# Patient Record
Sex: FEMALE | Race: ASIAN | URBAN - METROPOLITAN AREA
[De-identification: names, ages, dates, MRNs, and addresses within clinical notes are randomized per-mention and may not be internally consistent; named-entity substitution may affect disease eponyms.]

---

## 2017-02-20 ENCOUNTER — APPOINTMENT (OUTPATIENT)
Dept: ULTRASOUND IMAGING | Facility: CLINIC | Age: 22
End: 2017-02-20
Attending: EMERGENCY MEDICINE
Payer: COMMERCIAL

## 2017-02-20 ENCOUNTER — TRANSFERRED RECORDS (OUTPATIENT)
Dept: HEALTH INFORMATION MANAGEMENT | Facility: CLINIC | Age: 22
End: 2017-02-20

## 2017-02-20 ENCOUNTER — APPOINTMENT (OUTPATIENT)
Dept: CT IMAGING | Facility: CLINIC | Age: 22
End: 2017-02-20
Attending: EMERGENCY MEDICINE
Payer: COMMERCIAL

## 2017-02-20 ENCOUNTER — HOSPITAL ENCOUNTER (OUTPATIENT)
Facility: CLINIC | Age: 22
Setting detail: OBSERVATION
Discharge: HOME | End: 2017-02-21
Attending: EMERGENCY MEDICINE | Admitting: EMERGENCY MEDICINE
Payer: COMMERCIAL

## 2017-02-20 DIAGNOSIS — J02.9 ACUTE PHARYNGITIS, UNSPECIFIED ETIOLOGY: ICD-10-CM

## 2017-02-20 DIAGNOSIS — R11.2 NAUSEA AND VOMITING, INTRACTABILITY OF VOMITING NOT SPECIFIED, UNSPECIFIED VOMITING TYPE: ICD-10-CM

## 2017-02-20 DIAGNOSIS — R10.84 ABDOMINAL PAIN, GENERALIZED: ICD-10-CM

## 2017-02-20 DIAGNOSIS — G89.18 ACUTE POST-OPERATIVE PAIN: ICD-10-CM

## 2017-02-20 DIAGNOSIS — K59.00 CONSTIPATION, UNSPECIFIED CONSTIPATION TYPE: Primary | ICD-10-CM

## 2017-02-20 PROBLEM — R10.9 ABDOMINAL PAIN: Status: ACTIVE | Noted: 2017-02-20

## 2017-02-20 LAB
ALBUMIN SERPL-MCNC: 4.3 G/DL (ref 3.4–5)
ALP SERPL-CCNC: 77 U/L (ref 40–150)
ALT SERPL W P-5'-P-CCNC: 20 U/L (ref 0–50)
ANION GAP SERPL CALCULATED.3IONS-SCNC: 9 MMOL/L (ref 3–14)
AST SERPL W P-5'-P-CCNC: 14 U/L (ref 0–35)
BASOPHILS # BLD AUTO: 0 10E9/L (ref 0–0.2)
BASOPHILS NFR BLD AUTO: 0.2 %
BILIRUB SERPL-MCNC: 0.6 MG/DL (ref 0.2–1.3)
BUN SERPL-MCNC: 8 MG/DL (ref 7–30)
CALCIUM SERPL-MCNC: 9.1 MG/DL (ref 8.5–10.1)
CHLORIDE SERPL-SCNC: 105 MMOL/L (ref 96–110)
CO2 SERPL-SCNC: 26 MMOL/L (ref 20–32)
CREAT SERPL-MCNC: 0.66 MG/DL (ref 0.5–1)
DEPRECATED S PYO AG THROAT QL EIA: NORMAL
DIFFERENTIAL METHOD BLD: ABNORMAL
EOSINOPHIL # BLD AUTO: 0 10E9/L (ref 0–0.7)
EOSINOPHIL NFR BLD AUTO: 0 %
ERYTHROCYTE [DISTWIDTH] IN BLOOD BY AUTOMATED COUNT: 13.4 % (ref 10–15)
GFR SERPL CREATININE-BSD FRML MDRD: ABNORMAL ML/MIN/1.7M2
GLUCOSE SERPL-MCNC: 130 MG/DL (ref 70–99)
HCG SERPL QL: NEGATIVE
HCT VFR BLD AUTO: 41.6 % (ref 35–47)
HGB BLD-MCNC: 13.9 G/DL (ref 11.7–15.7)
IMM GRANULOCYTES # BLD: 0 10E9/L (ref 0–0.4)
IMM GRANULOCYTES NFR BLD: 0.3 %
LIPASE SERPL-CCNC: 118 U/L (ref 73–393)
LYMPHOCYTES # BLD AUTO: 0.5 10E9/L (ref 0.8–5.3)
LYMPHOCYTES NFR BLD AUTO: 3.3 %
MCH RBC QN AUTO: 29.8 PG (ref 26.5–33)
MCHC RBC AUTO-ENTMCNC: 33.4 G/DL (ref 31.5–36.5)
MCV RBC AUTO: 89 FL (ref 78–100)
MICRO REPORT STATUS: NORMAL
MONOCYTES # BLD AUTO: 0.4 10E9/L (ref 0–1.3)
MONOCYTES NFR BLD AUTO: 2.6 %
NEUTROPHILS # BLD AUTO: 13.5 10E9/L (ref 1.6–8.3)
NEUTROPHILS NFR BLD AUTO: 93.6 %
NRBC # BLD AUTO: 0 10*3/UL
NRBC BLD AUTO-RTO: 0 /100
PLATELET # BLD AUTO: 233 10E9/L (ref 150–450)
POTASSIUM SERPL-SCNC: 3.7 MMOL/L (ref 3.4–5.3)
PROT SERPL-MCNC: 8.4 G/DL (ref 6.8–8.8)
RBC # BLD AUTO: 4.67 10E12/L (ref 3.8–5.2)
SODIUM SERPL-SCNC: 140 MMOL/L (ref 133–144)
SPECIMEN SOURCE: NORMAL
WBC # BLD AUTO: 14.4 10E9/L (ref 4–11)

## 2017-02-20 PROCEDURE — 96374 THER/PROPH/DIAG INJ IV PUSH: CPT | Performed by: EMERGENCY MEDICINE

## 2017-02-20 PROCEDURE — 25800025 ZZH RX 258: Performed by: PHYSICIAN ASSISTANT

## 2017-02-20 PROCEDURE — 87880 STREP A ASSAY W/OPTIC: CPT | Performed by: PHYSICIAN ASSISTANT

## 2017-02-20 PROCEDURE — 83690 ASSAY OF LIPASE: CPT | Performed by: EMERGENCY MEDICINE

## 2017-02-20 PROCEDURE — 96361 HYDRATE IV INFUSION ADD-ON: CPT

## 2017-02-20 PROCEDURE — 87070 CULTURE OTHR SPECIMN AEROBIC: CPT | Performed by: PHYSICIAN ASSISTANT

## 2017-02-20 PROCEDURE — 99285 EMERGENCY DEPT VISIT HI MDM: CPT | Mod: 25 | Performed by: EMERGENCY MEDICINE

## 2017-02-20 PROCEDURE — 96375 TX/PRO/DX INJ NEW DRUG ADDON: CPT

## 2017-02-20 PROCEDURE — 25000125 ZZHC RX 250: Performed by: PHYSICIAN ASSISTANT

## 2017-02-20 PROCEDURE — 76705 ECHO EXAM OF ABDOMEN: CPT

## 2017-02-20 PROCEDURE — 84100 ASSAY OF PHOSPHORUS: CPT | Performed by: EMERGENCY MEDICINE

## 2017-02-20 PROCEDURE — 25500064 ZZH RX 255 OP 636: Performed by: RADIOLOGY

## 2017-02-20 PROCEDURE — 25000128 H RX IP 250 OP 636: Performed by: EMERGENCY MEDICINE

## 2017-02-20 PROCEDURE — 85025 COMPLETE CBC W/AUTO DIFF WBC: CPT | Performed by: EMERGENCY MEDICINE

## 2017-02-20 PROCEDURE — 99207 ZZC APP CREDIT; MD BILLING SHARED VISIT: CPT | Mod: Z6 | Performed by: EMERGENCY MEDICINE

## 2017-02-20 PROCEDURE — 96376 TX/PRO/DX INJ SAME DRUG ADON: CPT

## 2017-02-20 PROCEDURE — 84703 CHORIONIC GONADOTROPIN ASSAY: CPT | Performed by: EMERGENCY MEDICINE

## 2017-02-20 PROCEDURE — 80053 COMPREHEN METABOLIC PANEL: CPT | Performed by: EMERGENCY MEDICINE

## 2017-02-20 PROCEDURE — 25000128 H RX IP 250 OP 636: Performed by: PHYSICIAN ASSISTANT

## 2017-02-20 PROCEDURE — 96361 HYDRATE IV INFUSION ADD-ON: CPT | Performed by: EMERGENCY MEDICINE

## 2017-02-20 PROCEDURE — 83735 ASSAY OF MAGNESIUM: CPT | Performed by: EMERGENCY MEDICINE

## 2017-02-20 PROCEDURE — 96376 TX/PRO/DX INJ SAME DRUG ADON: CPT | Performed by: EMERGENCY MEDICINE

## 2017-02-20 PROCEDURE — 86140 C-REACTIVE PROTEIN: CPT | Performed by: EMERGENCY MEDICINE

## 2017-02-20 PROCEDURE — 84145 PROCALCITONIN (PCT): CPT | Performed by: EMERGENCY MEDICINE

## 2017-02-20 PROCEDURE — G0378 HOSPITAL OBSERVATION PER HR: HCPCS

## 2017-02-20 PROCEDURE — 74177 CT ABD & PELVIS W/CONTRAST: CPT

## 2017-02-20 PROCEDURE — 99220 ZZC INITIAL OBSERVATION CARE,LEVL III: CPT | Mod: Z6 | Performed by: PHYSICIAN ASSISTANT

## 2017-02-20 PROCEDURE — 25000132 ZZH RX MED GY IP 250 OP 250 PS 637: Performed by: PHYSICIAN ASSISTANT

## 2017-02-20 RX ORDER — AMOXICILLIN 250 MG
1-2 CAPSULE ORAL 2 TIMES DAILY
Status: DISCONTINUED | OUTPATIENT
Start: 2017-02-20 | End: 2017-02-22 | Stop reason: HOSPADM

## 2017-02-20 RX ORDER — IOPAMIDOL 755 MG/ML
120 INJECTION, SOLUTION INTRAVASCULAR ONCE
Status: COMPLETED | OUTPATIENT
Start: 2017-02-20 | End: 2017-02-20

## 2017-02-20 RX ORDER — HYDROMORPHONE HYDROCHLORIDE 1 MG/ML
0.5 INJECTION, SOLUTION INTRAMUSCULAR; INTRAVENOUS; SUBCUTANEOUS
Status: COMPLETED | OUTPATIENT
Start: 2017-02-20 | End: 2017-02-20

## 2017-02-20 RX ORDER — NALOXONE HYDROCHLORIDE 0.4 MG/ML
.1-.4 INJECTION, SOLUTION INTRAMUSCULAR; INTRAVENOUS; SUBCUTANEOUS
Status: DISCONTINUED | OUTPATIENT
Start: 2017-02-20 | End: 2017-02-22 | Stop reason: HOSPADM

## 2017-02-20 RX ORDER — ONDANSETRON 2 MG/ML
4 INJECTION INTRAMUSCULAR; INTRAVENOUS EVERY 6 HOURS PRN
Status: DISCONTINUED | OUTPATIENT
Start: 2017-02-20 | End: 2017-02-22 | Stop reason: HOSPADM

## 2017-02-20 RX ORDER — ONDANSETRON 4 MG/1
4 TABLET, ORALLY DISINTEGRATING ORAL EVERY 6 HOURS PRN
Status: DISCONTINUED | OUTPATIENT
Start: 2017-02-20 | End: 2017-02-22 | Stop reason: HOSPADM

## 2017-02-20 RX ORDER — PROCHLORPERAZINE 25 MG
25 SUPPOSITORY, RECTAL RECTAL EVERY 12 HOURS PRN
Status: DISCONTINUED | OUTPATIENT
Start: 2017-02-20 | End: 2017-02-22 | Stop reason: HOSPADM

## 2017-02-20 RX ORDER — ACETAMINOPHEN 500 MG
1000 TABLET ORAL EVERY 6 HOURS PRN
Status: DISCONTINUED | OUTPATIENT
Start: 2017-02-20 | End: 2017-02-21

## 2017-02-20 RX ORDER — ALUMINA, MAGNESIA, AND SIMETHICONE 2400; 2400; 240 MG/30ML; MG/30ML; MG/30ML
30 SUSPENSION ORAL ONCE
Status: COMPLETED | OUTPATIENT
Start: 2017-02-20 | End: 2017-02-20

## 2017-02-20 RX ORDER — HYDROMORPHONE HYDROCHLORIDE 1 MG/ML
0.5 INJECTION, SOLUTION INTRAMUSCULAR; INTRAVENOUS; SUBCUTANEOUS ONCE
Status: COMPLETED | OUTPATIENT
Start: 2017-02-20 | End: 2017-02-20

## 2017-02-20 RX ORDER — PROCHLORPERAZINE MALEATE 5 MG
5-10 TABLET ORAL EVERY 6 HOURS PRN
Status: DISCONTINUED | OUTPATIENT
Start: 2017-02-20 | End: 2017-02-22 | Stop reason: HOSPADM

## 2017-02-20 RX ORDER — SODIUM CHLORIDE, SODIUM LACTATE, POTASSIUM CHLORIDE, CALCIUM CHLORIDE 600; 310; 30; 20 MG/100ML; MG/100ML; MG/100ML; MG/100ML
INJECTION, SOLUTION INTRAVENOUS CONTINUOUS
Status: DISCONTINUED | OUTPATIENT
Start: 2017-02-20 | End: 2017-02-22 | Stop reason: HOSPADM

## 2017-02-20 RX ADMIN — SODIUM CHLORIDE, POTASSIUM CHLORIDE, SODIUM LACTATE AND CALCIUM CHLORIDE: 600; 310; 30; 20 INJECTION, SOLUTION INTRAVENOUS at 23:16

## 2017-02-20 RX ADMIN — HYDROMORPHONE HYDROCHLORIDE 0.5 MG: 10 INJECTION, SOLUTION INTRAMUSCULAR; INTRAVENOUS; SUBCUTANEOUS at 21:55

## 2017-02-20 RX ADMIN — ALUMINUM HYDROXIDE, MAGNESIUM HYDROXIDE, AND DIMETHICONE 30 ML: 400; 400; 40 SUSPENSION ORAL at 23:16

## 2017-02-20 RX ADMIN — IOPAMIDOL 120 ML: 755 INJECTION, SOLUTION INTRAVENOUS at 16:09

## 2017-02-20 RX ADMIN — HYDROMORPHONE HYDROCHLORIDE 0.5 MG: 10 INJECTION, SOLUTION INTRAMUSCULAR; INTRAVENOUS; SUBCUTANEOUS at 16:22

## 2017-02-20 RX ADMIN — SENNOSIDES AND DOCUSATE SODIUM 2 TABLET: 8.6; 5 TABLET ORAL at 23:17

## 2017-02-20 RX ADMIN — HYDROMORPHONE HYDROCHLORIDE 0.5 MG: 10 INJECTION, SOLUTION INTRAMUSCULAR; INTRAVENOUS; SUBCUTANEOUS at 14:39

## 2017-02-20 RX ADMIN — HYDROMORPHONE HYDROCHLORIDE 0.5 MG: 10 INJECTION, SOLUTION INTRAMUSCULAR; INTRAVENOUS; SUBCUTANEOUS at 18:34

## 2017-02-20 RX ADMIN — SODIUM CHLORIDE 1000 ML: 9 INJECTION, SOLUTION INTRAVENOUS at 14:11

## 2017-02-20 RX ADMIN — PANTOPRAZOLE SODIUM 40 MG: 40 INJECTION, POWDER, FOR SOLUTION INTRAVENOUS at 23:17

## 2017-02-20 RX ADMIN — ONDANSETRON 4 MG: 2 INJECTION INTRAMUSCULAR; INTRAVENOUS at 21:55

## 2017-02-20 ASSESSMENT — ENCOUNTER SYMPTOMS
ABDOMINAL PAIN: 1
DIFFICULTY URINATING: 0
LIGHT-HEADEDNESS: 0
ADENOPATHY: 0
NECK STIFFNESS: 0
POLYDIPSIA: 0
VOMITING: 1
NECK PAIN: 0
DIARRHEA: 0
BACK PAIN: 0
AGITATION: 0
DYSURIA: 0
BRUISES/BLEEDS EASILY: 0
COLOR CHANGE: 0
SHORTNESS OF BREATH: 0
CHILLS: 0
CONSTIPATION: 0
ABDOMINAL DISTENTION: 0
HEMATURIA: 0
ANAL BLEEDING: 0
NAUSEA: 1
FEVER: 0

## 2017-02-20 ASSESSMENT — PAIN DESCRIPTION - DESCRIPTORS: DESCRIPTORS: PRESSURE;OTHER (COMMENT)

## 2017-02-20 NOTE — CONSULTS
GENERAL SURGERY CONSULT  February 20, 2017      HISTORY PRESENTING ILLNESS: Karthikeyan Abreu is a 21 year old female with no significant PMH who presents with one day history of epigastric pain with nausea and one episode of vomiting. Patient had epigastric pain last night. She took tums and went to sleep, In the morning the pain was still persistent. She had one episode of vomiting in the morning. Patient went to clinic where her labs showed leukocytosis. She was sent to the ED for further workup. Currently patient reports decreased pain. No nausea or further vomiting. No fevers or chills. Patient was constipated last night. No change in urinary habits. Of note, patient reports that she had the flu last week but she recovered quickly from it.     REVIEW OF SYSTEMS: As noted above. No headache, dizziness. No chest pain or shortness of breath. No urinary difficulties. No muscle or body aches.     PAST MEDICAL HISTORY:   Eczema     SURGICAL HISTORY:   None    SOCIAL HISTORY: Tobacco - Never. Etoh - None. Drugs - None. 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    FAMILY HISTORY: No bleeding/clotting disorders nor problems with anesthesia.     ALLERGIES:    No Known Allergies    MEDICATIONS:    No current facility-administered medications on file prior to encounter.   No current outpatient prescriptions on file prior to encounter.    PHYSICAL EXAMINATION:  Temp:  [97.9  F (36.6  C)] 97.9  F (36.6  C)  Pulse:  [92] 92  Resp:  [18] 18  BP: (121)/(79) 121/79  SpO2:  [97 %] 97 %  General: Alert, well-appearing in no acute distress.  HEENT: Normocephalic, atraumatic. Patent nares.   Respiratory: Non-labored breathing.   Cardiovascular: Regular rate and rhythm.   Gastrointestinal: Abdomen soft, non-distended. Minimally ttp over the periumbilical area and lower quadrants L>R tenderness. No rebound,   Extremities: Moving all four extremities.   Skin: As noted above. No rashes or lesions appreciated.    LABS:  Reviewed.       Recent Labs  Lab 02/20/17  1413   WBC 14.4*   HGB 13.9        Basic Metabolic Panel    Recent Labs  Lab 02/20/17  1413      POTASSIUM 3.7   CHLORIDE 105   CO2 26   BUN 8   CR 0.66   *     Liver Function Tests    Recent Labs  Lab 02/20/17  1413   AST 14   ALT 20   ALKPHOS 77   BILITOTAL 0.6   ALBUMIN 4.3     Pancreatic Enzymes    Recent Labs  Lab 02/20/17  1413   LIPASE 118     Coagulation Profile  No lab results found in last 7 days.      IMAGING:  Results for orders placed or performed during the hospital encounter of 02/20/17   US Abdomen Limited    Narrative    EXAMINATION: US ABDOMEN LIMITED, 2/20/2017 3:14 PM     COMPARISON: None.    HISTORY: Right upper quadrant pain. Normal LFTs.    FINDINGS:   Fluid: No evidence of ascites or pleural effusions.    Liver: The liver demonstrates normal echotexture measuring 17.6 cm in  clinical dimension. Somewhat prominent portal triads, likely related  to technique. There is no focal mass.     Gallbladder: There is no wall thickening, pericholecystic fluid,  positive sonographic Harrington's sign or evidence for cholelithiasis.    Bile Ducts: Both the intra- and extrahepatic biliary system are of  normal caliber.  The common bile duct measures 3 mm in diameter.    Pancreas: Visualized portions of the head and body of the pancreas are  unremarkable.     Kidney: The right kidney measures 11.0 cm long. There is no  hydronephrosis or hydroureter, no shadowing renal calculi, cystic  lesion or mass.       Impression    IMPRESSION: No US findings to explain patient's right upper quadrant  pain.     I have personally reviewed the examination and initial interpretation  and I agree with the findings.    ROBERT BURGOS MD   CT Abdomen Pelvis w Contrast    Narrative    EXAMINATION: CT ABDOMEN PELVIS W CONTRAST, 2/20/2017 4:18 PM    TECHNIQUE:  Helical CT images from the lung bases through the  symphysis pubis were obtained with IV contrast. Contrast  dose: 120 mL  of Isovue-370.    COMPARISON: Abdominal ultrasound performed the same day.    HISTORY: Diffuse abdominal pain.    FINDINGS:    Abdomen and pelvis: Mild hypoattenuation along the ligamentum teres  likely representing focal fat deposition. The liver is otherwise  unremarkable. The gallbladder, adrenal glands, kidneys, pancreas and  spleen are within normal limits. Punctate splenule posterior to the  spleen. The stomach, small bowel and colon are within normal limits.  The appendix is significantly dilated measuring 2.2 cm in diameter.  However, there is no wall thickening. There is air within the distal  appendix. There is no periappendiceal inflammatory stranding. No  extraluminal air. No suspicious adenopathy.    The bladder is mildly distended and otherwise unremarkable. Trace  retrouterine free fluid. The adnexa are within normal limits for age  with a 1.4 cm right ovarian cyst, likely a dominant follicle.    Lung bases: Lung bases are clear.    Bones and soft tissues: The soft tissues are within normal limits. No  suspicious osseous lesions.      Impression    IMPRESSION:   1. The appendix is significantly distended with stool. However, it  otherwise appears normal without inflammatory changes. Findings are  probably normal.     2. Trace retrouterine free fluid, likely physiologic.     Results of dilated appendix were discussed with the ordering physician  Dr. Osmany Bowers at 4:40 PM on 2/20/2017 by Dr. Jones.    I have personally reviewed the examination and initial interpretation  and I agree with the findings.    ROBERT BURGOS MD         CO-MORBIDITIES:   Abdominal pain, generalized    ASSESSMENT: Karthikeyan Abreu is a 21 year old female with one day history of abdominal pain, n/v. Work up significant for leukocytosis and dilated appendix. UA pending.     PLAN:    - ED observation overnight  - Repeat WBC in the morning  - Serial abdominal exams overnight.   - Follow up UA    Patient  seen, findings and plan discussed with chief resident and staff, Dr. Menendez.      Pricilla Michel MD  General Surgery, PGY-1  [phone]

## 2017-02-20 NOTE — IP AVS SNAPSHOT
Unit [address] Observation [address] [address]    Phone:  [phone]    Fax:  [phone]                                       After Visit Summary   2/20/2017    Karthikeyan Abreu    MRN: [id number]           After Visit Summary Signature Page     I have received my discharge instructions, and my questions have been answered. I have discussed any challenges I see with this plan with the nurse or doctor.    ..........................................................................................................................................  Patient/Patient Representative Signature      ..........................................................................................................................................  Patient Representative Print Name and Relationship to Patient    ..................................................               ................................................  Date                                            Time    ..........................................................................................................................................  Reviewed by Signature/Title    ...................................................              ..............................................  Date                                                            Time

## 2017-02-20 NOTE — ED NOTES
PT c/o abdominal pain since last evening + N/V. PT reports 5 BM's since last evening. BM's are hard, formed.

## 2017-02-20 NOTE — ED PROVIDER NOTES
History     Chief Complaint   Patient presents with     Abdominal Pain     HPI  Karthikeyan Abreu is a 21 year old otherwise healthy female who presents to the ED today with abdominal pain. Patient reports onset of upper abdominal pain since last night that worsened this morning. Patient was seen today by her primary physician at [place]. X rays were taken and labs were drawn. Her doctor referred her to ED with concern for possible ruptured appendix. Patient denies any urinary symptoms. She reports passing small amounts of formed stools. She notes nausea with movement. She states she vomited this morning and last night after eating dinner. She denies history of abdominal surgeries. She is not on any chronic medications. She denies fever or cough.       I have reviewed the Medications, Allergies, Past Medical and Surgical History, and Social History in the [employer] system.    PAST MEDICAL HISTORY: History reviewed. No pertinent past medical history.    PAST SURGICAL HISTORY: History reviewed. No pertinent past surgical history.    FAMILY HISTORY: No family history on file.    SOCIAL HISTORY:   Social History   Substance Use Topics     Smoking status: Not on file     Smokeless tobacco: Not on file     Alcohol use Not on file     No current facility-administered medications for this encounter.      No current outpatient prescriptions on file.      No Known Allergies      Review of Systems   Constitutional: Negative for chills and fever.   HENT: Negative for congestion.    Eyes: Negative for visual disturbance.   Respiratory: Negative for shortness of breath.    Cardiovascular: Negative for chest pain.   Gastrointestinal: Positive for abdominal pain, nausea and vomiting. Negative for abdominal distention, anal bleeding, constipation and diarrhea.   Endocrine: Negative for polydipsia and polyuria.   Genitourinary: Negative for difficulty urinating, dysuria, hematuria, pelvic pain, vaginal bleeding and vaginal discharge.  "  Musculoskeletal: Negative for back pain, neck pain and neck stiffness.   Skin: Negative for color change.   Allergic/Immunologic: Negative for immunocompromised state.   Neurological: Negative for light-headedness.   Hematological: Negative for adenopathy. Does not bruise/bleed easily.   Psychiatric/Behavioral: Negative for agitation and behavioral problems.       Physical Exam   BP: 121/79  Pulse: 92  Temp: 97.9  F (36.6  C)  Resp: 18  Height: 170.2 cm (5' 7\")  Weight: 88.9 kg (196 lb)  SpO2: 97 %  Physical Exam   Constitutional: She is oriented to person, place, and time. She appears well-developed and well-nourished. No distress.   HENT:   Head: Normocephalic and atraumatic.   Mouth/Throat: Oropharynx is clear and moist. No oropharyngeal exudate.   Eyes: Conjunctivae and EOM are normal. No scleral icterus.   Neck: Normal range of motion. Neck supple.   Cardiovascular: Normal rate, normal heart sounds and intact distal pulses.    Pulmonary/Chest: Effort normal and breath sounds normal. No respiratory distress. She has no wheezes. She has no rales.   Abdominal: Soft. Normal appearance and bowel sounds are normal. She exhibits no distension. There is no hepatosplenomegaly. Tenderness:  diffuse. There is no rebound, no guarding, no CVA tenderness, no tenderness at McBurney's point and negative Harrington's sign.   Musculoskeletal: Normal range of motion. She exhibits no edema or tenderness.   Neurological: She is alert and oriented to person, place, and time. No cranial nerve deficit. She exhibits normal muscle tone. Coordination normal.   Skin: Skin is warm. No rash noted. She is not diaphoretic.   Psychiatric: She has a normal mood and affect. Her behavior is normal. Judgment and thought content normal.   Nursing note and vitals reviewed.      ED Course     ED Course     Procedures       2:14 PM  The patient was seen and examined by Dr. Bowers in Room 20.          Critical Care time:  none               Labs Ordered " and Resulted from Time of ED Arrival Up to the Time of Departure from the ED   CBC WITH PLATELETS DIFFERENTIAL - Abnormal; Notable for the following:        Result Value    WBC 14.4 (*)     Absolute Neutrophil 13.5 (*)     Absolute Lymphocytes 0.5 (*)     All other components within normal limits   COMPREHENSIVE METABOLIC PANEL - Abnormal; Notable for the following:     Glucose 130 (*)     All other components within normal limits   LIPASE   HCG QUALITATIVE   ROUTINE UA WITH MICROSCOPIC   PERIPHERAL IV CATHETER       Assessments & Plan (with Medical Decision Making)   21 year old woman presenting with upper abdominal pain, nausea and vomiting since last night.  Differential diagnosis: acute pancreatitis, acute cholecystitis, hepatitis, PUD, GERD, esophageal spasm, unlikely acute appendicitis, pregnancy.    After thorough history and physical exam patient appears to be in no acute distress. I will give her a dose of IV Dilaudid for pain management. I will obtain laboratory studies and a right upper quadrant ultrasound for further evaluation. She agrees with the plan.     Patient s laboratory studies returned with a leukocytosis 14,400. There is no anemia, hemoglobin is normal at 13.9. Electrolytes show no evidence of dehydration, creatinine is normal at 0.66. LFTs and lipase are normal. Patient is not pregnant. I reviewed her right upper quadrant ultrasound and I read the radiology report; there is no evidence of acute cholecystitis or cholelithiasis. I did order CT abdomen and pelvis. I reviewed the study and read the radiology report; the appendix appears dilated with the diameter measuring 2.2 cm. There is no other etiology to explain patient s symptoms. I consulted to General Surgery due to concern that this could be acute appendicitis. They will come down and see the patient.    5:40 PM  Patient will be signed out to my partner pending surgical consultation and disposition.    I have reviewed the nursing  notes.    I have reviewed the findings, diagnosis, plan and need for follow up with the patient.    New Prescriptions    No medications on file       Final diagnoses:   Abdominal pain, generalized     IAsad , am serving as a trained medical scribe to document services personally performed by Black Bowers MD, based on the provider's statements to me.   IBlack MD, was physically present and have reviewed and verified the accuracy of this note documented by Asad Mims.    2/20/2017   [facility], [place], EMERGENCY DEPARTMENT     Jackson Bowers MD  02/20/17 [id number]

## 2017-02-21 ENCOUNTER — ANESTHESIA EVENT (OUTPATIENT)
Dept: SURGERY | Facility: CLINIC | Age: 22
End: 2017-02-21
Payer: COMMERCIAL

## 2017-02-21 ENCOUNTER — ANESTHESIA (OUTPATIENT)
Dept: SURGERY | Facility: CLINIC | Age: 22
End: 2017-02-21
Payer: COMMERCIAL

## 2017-02-21 VITALS
TEMPERATURE: 98.1 F | SYSTOLIC BLOOD PRESSURE: 126 MMHG | HEART RATE: 90 BPM | DIASTOLIC BLOOD PRESSURE: 78 MMHG | WEIGHT: 196 LBS | OXYGEN SATURATION: 97 % | BODY MASS INDEX: 30.76 KG/M2 | RESPIRATION RATE: 18 BRPM | HEIGHT: 67 IN

## 2017-02-21 LAB
ALBUMIN SERPL-MCNC: 3.6 G/DL (ref 3.4–5)
ALBUMIN UR-MCNC: NEGATIVE MG/DL
ALP SERPL-CCNC: 61 U/L (ref 40–150)
ALT SERPL W P-5'-P-CCNC: 15 U/L (ref 0–50)
ANION GAP SERPL CALCULATED.3IONS-SCNC: 9 MMOL/L (ref 3–14)
APPEARANCE UR: CLEAR
AST SERPL W P-5'-P-CCNC: 7 U/L (ref 0–35)
BILIRUB SERPL-MCNC: 1 MG/DL (ref 0.2–1.3)
BILIRUB UR QL STRIP: NEGATIVE
BUN SERPL-MCNC: 6 MG/DL (ref 7–30)
CALCIUM SERPL-MCNC: 8.6 MG/DL (ref 8.5–10.1)
CHLORIDE SERPL-SCNC: 103 MMOL/L (ref 96–110)
CO2 SERPL-SCNC: 24 MMOL/L (ref 20–32)
COLOR UR AUTO: YELLOW
CREAT SERPL-MCNC: 0.56 MG/DL (ref 0.5–1)
CRP SERPL-MCNC: 68 MG/L (ref 0–8)
CRP SERPL-MCNC: 9.1 MG/L (ref 0–8)
ERYTHROCYTE [DISTWIDTH] IN BLOOD BY AUTOMATED COUNT: 13.4 % (ref 10–15)
GFR SERPL CREATININE-BSD FRML MDRD: ABNORMAL ML/MIN/1.7M2
GLUCOSE SERPL-MCNC: 122 MG/DL (ref 70–99)
GLUCOSE UR STRIP-MCNC: NEGATIVE MG/DL
HCT VFR BLD AUTO: 37.7 % (ref 35–47)
HGB BLD-MCNC: 12.7 G/DL (ref 11.7–15.7)
HGB UR QL STRIP: NEGATIVE
KETONES UR STRIP-MCNC: NEGATIVE MG/DL
LACTATE BLD-SCNC: 1.1 MMOL/L (ref 0.7–2.1)
LEUKOCYTE ESTERASE UR QL STRIP: NEGATIVE
MAGNESIUM SERPL-MCNC: 2 MG/DL (ref 1.6–2.3)
MCH RBC QN AUTO: 29.9 PG (ref 26.5–33)
MCHC RBC AUTO-ENTMCNC: 33.7 G/DL (ref 31.5–36.5)
MCV RBC AUTO: 89 FL (ref 78–100)
MUCOUS THREADS #/AREA URNS LPF: PRESENT /LPF
NITRATE UR QL: NEGATIVE
PH UR STRIP: 6.5 PH (ref 5–7)
PHOSPHATE SERPL-MCNC: 2.9 MG/DL (ref 2.5–4.5)
PLATELET # BLD AUTO: 178 10E9/L (ref 150–450)
POTASSIUM SERPL-SCNC: 3.8 MMOL/L (ref 3.4–5.3)
PROCALCITONIN SERPL-MCNC: NORMAL NG/ML
PROT SERPL-MCNC: 7.1 G/DL (ref 6.8–8.8)
RBC # BLD AUTO: 4.25 10E12/L (ref 3.8–5.2)
RBC #/AREA URNS AUTO: 0 /HPF (ref 0–2)
SODIUM SERPL-SCNC: 136 MMOL/L (ref 133–144)
SP GR UR STRIP: 1.04 (ref 1–1.03)
SQUAMOUS #/AREA URNS AUTO: <1 /HPF (ref 0–1)
TRANS CELLS #/AREA URNS HPF: <1 /HPF (ref 0–1)
URN SPEC COLLECT METH UR: ABNORMAL
UROBILINOGEN UR STRIP-MCNC: NORMAL MG/DL (ref 0–2)
WBC # BLD AUTO: 16.6 10E9/L (ref 4–11)
WBC #/AREA URNS AUTO: 0 /HPF (ref 0–2)

## 2017-02-21 PROCEDURE — 25000128 H RX IP 250 OP 636: Performed by: SURGERY

## 2017-02-21 PROCEDURE — 83605 ASSAY OF LACTIC ACID: CPT | Performed by: EMERGENCY MEDICINE

## 2017-02-21 PROCEDURE — 37000008 ZZH ANESTHESIA TECHNICAL FEE, 1ST 30 MIN: Performed by: SURGERY

## 2017-02-21 PROCEDURE — 96367 TX/PROPH/DG ADDL SEQ IV INF: CPT

## 2017-02-21 PROCEDURE — 96365 THER/PROPH/DIAG IV INF INIT: CPT

## 2017-02-21 PROCEDURE — 96361 HYDRATE IV INFUSION ADD-ON: CPT

## 2017-02-21 PROCEDURE — 71000014 ZZH RECOVERY PHASE 1 LEVEL 2 FIRST HR: Performed by: SURGERY

## 2017-02-21 PROCEDURE — 25000128 H RX IP 250 OP 636: Performed by: NURSE ANESTHETIST, CERTIFIED REGISTERED

## 2017-02-21 PROCEDURE — 25800025 ZZH RX 258: Performed by: NURSE ANESTHETIST, CERTIFIED REGISTERED

## 2017-02-21 PROCEDURE — 25000566 ZZH SEVOFLURANE, EA 15 MIN: Performed by: SURGERY

## 2017-02-21 PROCEDURE — 25000125 ZZHC RX 250: Performed by: NURSE ANESTHETIST, CERTIFIED REGISTERED

## 2017-02-21 PROCEDURE — 36000059 ZZH SURGERY LEVEL 3 EA 15 ADDTL MIN UMMC: Performed by: SURGERY

## 2017-02-21 PROCEDURE — 25000125 ZZHC RX 250: Performed by: SURGERY

## 2017-02-21 PROCEDURE — 27210794 ZZH OR GENERAL SUPPLY STERILE: Performed by: SURGERY

## 2017-02-21 PROCEDURE — 88304 TISSUE EXAM BY PATHOLOGIST: CPT | Performed by: SURGERY

## 2017-02-21 PROCEDURE — 36415 COLL VENOUS BLD VENIPUNCTURE: CPT | Performed by: PHYSICIAN ASSISTANT

## 2017-02-21 PROCEDURE — G0378 HOSPITAL OBSERVATION PER HR: HCPCS

## 2017-02-21 PROCEDURE — 37000009 ZZH ANESTHESIA TECHNICAL FEE, EACH ADDTL 15 MIN: Performed by: SURGERY

## 2017-02-21 PROCEDURE — 36000057 ZZH SURGERY LEVEL 3 1ST 30 MIN - UMMC: Performed by: SURGERY

## 2017-02-21 PROCEDURE — 36415 COLL VENOUS BLD VENIPUNCTURE: CPT | Performed by: EMERGENCY MEDICINE

## 2017-02-21 PROCEDURE — 40000556 ZZH STATISTIC PERIPHERAL IV START W US GUIDANCE

## 2017-02-21 PROCEDURE — 96376 TX/PRO/DX INJ SAME DRUG ADON: CPT

## 2017-02-21 PROCEDURE — 85027 COMPLETE CBC AUTOMATED: CPT | Performed by: SURGERY

## 2017-02-21 PROCEDURE — 86140 C-REACTIVE PROTEIN: CPT | Performed by: PHYSICIAN ASSISTANT

## 2017-02-21 PROCEDURE — 25000128 H RX IP 250 OP 636: Performed by: PHYSICIAN ASSISTANT

## 2017-02-21 PROCEDURE — 25800025 ZZH RX 258: Performed by: PHYSICIAN ASSISTANT

## 2017-02-21 PROCEDURE — 81001 URINALYSIS AUTO W/SCOPE: CPT | Performed by: EMERGENCY MEDICINE

## 2017-02-21 PROCEDURE — 40000170 ZZH STATISTIC PRE-PROCEDURE ASSESSMENT II: Performed by: SURGERY

## 2017-02-21 PROCEDURE — 80053 COMPREHEN METABOLIC PANEL: CPT | Performed by: PHYSICIAN ASSISTANT

## 2017-02-21 PROCEDURE — 25000132 ZZH RX MED GY IP 250 OP 250 PS 637: Performed by: PHYSICIAN ASSISTANT

## 2017-02-21 PROCEDURE — 25000132 ZZH RX MED GY IP 250 OP 250 PS 637: Performed by: SURGERY

## 2017-02-21 PROCEDURE — 25000125 ZZHC RX 250: Performed by: PHYSICIAN ASSISTANT

## 2017-02-21 RX ORDER — ONDANSETRON 2 MG/ML
4 INJECTION INTRAMUSCULAR; INTRAVENOUS EVERY 30 MIN PRN
Status: DISCONTINUED | OUTPATIENT
Start: 2017-02-21 | End: 2017-02-21 | Stop reason: HOSPADM

## 2017-02-21 RX ORDER — FENTANYL CITRATE 50 UG/ML
25-50 INJECTION, SOLUTION INTRAMUSCULAR; INTRAVENOUS
Status: DISCONTINUED | OUTPATIENT
Start: 2017-02-21 | End: 2017-02-21 | Stop reason: HOSPADM

## 2017-02-21 RX ORDER — AMOXICILLIN 250 MG
1-2 CAPSULE ORAL 2 TIMES DAILY
Qty: 100 TABLET | Refills: 1 | Status: SHIPPED
Start: 2017-02-21

## 2017-02-21 RX ORDER — NEOSTIGMINE METHYLSULFATE 1 MG/ML
VIAL (ML) INJECTION PRN
Status: DISCONTINUED | OUTPATIENT
Start: 2017-02-21 | End: 2017-02-21

## 2017-02-21 RX ORDER — ONDANSETRON 4 MG/1
4 TABLET, ORALLY DISINTEGRATING ORAL EVERY 30 MIN PRN
Status: DISCONTINUED | OUTPATIENT
Start: 2017-02-21 | End: 2017-02-21 | Stop reason: HOSPADM

## 2017-02-21 RX ORDER — AMOXICILLIN 250 MG
1-2 CAPSULE ORAL 2 TIMES DAILY
Qty: 100 TABLET | Refills: 1 | Status: SHIPPED
Start: 2017-02-21 | End: 2017-02-21

## 2017-02-21 RX ORDER — CEFAZOLIN SODIUM 2 G/100ML
2 INJECTION, SOLUTION INTRAVENOUS
Status: COMPLETED | OUTPATIENT
Start: 2017-02-21 | End: 2017-02-21

## 2017-02-21 RX ORDER — HYDROMORPHONE HYDROCHLORIDE 1 MG/ML
.3-.5 INJECTION, SOLUTION INTRAMUSCULAR; INTRAVENOUS; SUBCUTANEOUS EVERY 5 MIN PRN
Status: DISCONTINUED | OUTPATIENT
Start: 2017-02-21 | End: 2017-02-21 | Stop reason: HOSPADM

## 2017-02-21 RX ORDER — ACETAMINOPHEN 10 MG/ML
1000 INJECTION, SOLUTION INTRAVENOUS ONCE
Status: COMPLETED | OUTPATIENT
Start: 2017-02-21 | End: 2017-02-21

## 2017-02-21 RX ORDER — FENTANYL CITRATE 50 UG/ML
25-50 INJECTION, SOLUTION INTRAMUSCULAR; INTRAVENOUS
Status: CANCELLED | OUTPATIENT
Start: 2017-02-21

## 2017-02-21 RX ORDER — NALOXONE HYDROCHLORIDE 0.4 MG/ML
.1-.4 INJECTION, SOLUTION INTRAMUSCULAR; INTRAVENOUS; SUBCUTANEOUS
Status: DISCONTINUED | OUTPATIENT
Start: 2017-02-21 | End: 2017-02-21 | Stop reason: HOSPADM

## 2017-02-21 RX ORDER — OXYCODONE AND ACETAMINOPHEN 5; 325 MG/1; MG/1
1 TABLET ORAL EVERY 4 HOURS PRN
Status: DISCONTINUED | OUTPATIENT
Start: 2017-02-21 | End: 2017-02-22 | Stop reason: HOSPADM

## 2017-02-21 RX ORDER — GLYCOPYRROLATE 0.2 MG/ML
INJECTION, SOLUTION INTRAMUSCULAR; INTRAVENOUS PRN
Status: DISCONTINUED | OUTPATIENT
Start: 2017-02-21 | End: 2017-02-21

## 2017-02-21 RX ORDER — DEXAMETHASONE SODIUM PHOSPHATE 4 MG/ML
INJECTION, SOLUTION INTRA-ARTICULAR; INTRALESIONAL; INTRAMUSCULAR; INTRAVENOUS; SOFT TISSUE PRN
Status: DISCONTINUED | OUTPATIENT
Start: 2017-02-21 | End: 2017-02-21

## 2017-02-21 RX ORDER — KETOROLAC TROMETHAMINE 30 MG/ML
30 INJECTION, SOLUTION INTRAMUSCULAR; INTRAVENOUS
Status: DISCONTINUED | OUTPATIENT
Start: 2017-02-21 | End: 2017-02-21 | Stop reason: HOSPADM

## 2017-02-21 RX ORDER — PROPOFOL 10 MG/ML
INJECTION, EMULSION INTRAVENOUS PRN
Status: DISCONTINUED | OUTPATIENT
Start: 2017-02-21 | End: 2017-02-21

## 2017-02-21 RX ORDER — LIDOCAINE HYDROCHLORIDE 20 MG/ML
INJECTION, SOLUTION INFILTRATION; PERINEURAL PRN
Status: DISCONTINUED | OUTPATIENT
Start: 2017-02-21 | End: 2017-02-21

## 2017-02-21 RX ORDER — ERTAPENEM 1 G/1
1 INJECTION, POWDER, LYOPHILIZED, FOR SOLUTION INTRAMUSCULAR; INTRAVENOUS ONCE
Status: COMPLETED | OUTPATIENT
Start: 2017-02-21 | End: 2017-02-21

## 2017-02-21 RX ORDER — ONDANSETRON 2 MG/ML
INJECTION INTRAMUSCULAR; INTRAVENOUS PRN
Status: DISCONTINUED | OUTPATIENT
Start: 2017-02-21 | End: 2017-02-21

## 2017-02-21 RX ORDER — OXYCODONE AND ACETAMINOPHEN 5; 325 MG/1; MG/1
1-2 TABLET ORAL EVERY 4 HOURS PRN
Qty: 15 TABLET | Refills: 0 | Status: SHIPPED | OUTPATIENT
Start: 2017-02-21 | End: 2017-02-21

## 2017-02-21 RX ORDER — CEFAZOLIN SODIUM 1 G/3ML
1 INJECTION, POWDER, FOR SOLUTION INTRAMUSCULAR; INTRAVENOUS SEE ADMIN INSTRUCTIONS
Status: DISCONTINUED | OUTPATIENT
Start: 2017-02-21 | End: 2017-02-22 | Stop reason: HOSPADM

## 2017-02-21 RX ORDER — HYDROMORPHONE HYDROCHLORIDE 1 MG/ML
0.5 INJECTION, SOLUTION INTRAMUSCULAR; INTRAVENOUS; SUBCUTANEOUS ONCE
Status: COMPLETED | OUTPATIENT
Start: 2017-02-21 | End: 2017-02-21

## 2017-02-21 RX ORDER — HYDROMORPHONE HYDROCHLORIDE 1 MG/ML
.3-.5 INJECTION, SOLUTION INTRAMUSCULAR; INTRAVENOUS; SUBCUTANEOUS EVERY 10 MIN PRN
Status: DISCONTINUED | OUTPATIENT
Start: 2017-02-21 | End: 2017-02-21 | Stop reason: HOSPADM

## 2017-02-21 RX ORDER — SODIUM CHLORIDE, SODIUM LACTATE, POTASSIUM CHLORIDE, CALCIUM CHLORIDE 600; 310; 30; 20 MG/100ML; MG/100ML; MG/100ML; MG/100ML
INJECTION, SOLUTION INTRAVENOUS CONTINUOUS
Status: DISCONTINUED | OUTPATIENT
Start: 2017-02-21 | End: 2017-02-21 | Stop reason: HOSPADM

## 2017-02-21 RX ORDER — FENTANYL CITRATE 50 UG/ML
INJECTION, SOLUTION INTRAMUSCULAR; INTRAVENOUS PRN
Status: DISCONTINUED | OUTPATIENT
Start: 2017-02-21 | End: 2017-02-21

## 2017-02-21 RX ORDER — SODIUM CHLORIDE, SODIUM LACTATE, POTASSIUM CHLORIDE, CALCIUM CHLORIDE 600; 310; 30; 20 MG/100ML; MG/100ML; MG/100ML; MG/100ML
INJECTION, SOLUTION INTRAVENOUS CONTINUOUS PRN
Status: DISCONTINUED | OUTPATIENT
Start: 2017-02-21 | End: 2017-02-21

## 2017-02-21 RX ORDER — MEPERIDINE HYDROCHLORIDE 25 MG/ML
12.5 INJECTION INTRAMUSCULAR; INTRAVENOUS; SUBCUTANEOUS
Status: DISCONTINUED | OUTPATIENT
Start: 2017-02-21 | End: 2017-02-21 | Stop reason: HOSPADM

## 2017-02-21 RX ORDER — BUPIVACAINE HYDROCHLORIDE 2.5 MG/ML
INJECTION, SOLUTION EPIDURAL; INFILTRATION; INTRACAUDAL PRN
Status: DISCONTINUED | OUTPATIENT
Start: 2017-02-21 | End: 2017-02-21 | Stop reason: HOSPADM

## 2017-02-21 RX ORDER — HYDROMORPHONE HCL/0.9% NACL/PF 0.2MG/0.2
.2-.4 SYRINGE (ML) INTRAVENOUS
Status: DISCONTINUED | OUTPATIENT
Start: 2017-02-21 | End: 2017-02-22 | Stop reason: HOSPADM

## 2017-02-21 RX ORDER — OXYCODONE AND ACETAMINOPHEN 5; 325 MG/1; MG/1
1-2 TABLET ORAL EVERY 4 HOURS PRN
Qty: 15 TABLET | Refills: 0 | Status: SHIPPED | OUTPATIENT
Start: 2017-02-21

## 2017-02-21 RX ORDER — OXYCODONE HYDROCHLORIDE 5 MG/1
5-10 TABLET ORAL EVERY 4 HOURS PRN
Status: DISCONTINUED | OUTPATIENT
Start: 2017-02-21 | End: 2017-02-21

## 2017-02-21 RX ADMIN — GLYCOPYRROLATE 0.6 MG: 0.2 INJECTION, SOLUTION INTRAMUSCULAR; INTRAVENOUS at 14:49

## 2017-02-21 RX ADMIN — ROCURONIUM BROMIDE 10 MG: 10 INJECTION INTRAVENOUS at 14:19

## 2017-02-21 RX ADMIN — SODIUM CHLORIDE, POTASSIUM CHLORIDE, SODIUM LACTATE AND CALCIUM CHLORIDE: 600; 310; 30; 20 INJECTION, SOLUTION INTRAVENOUS at 16:37

## 2017-02-21 RX ADMIN — HYDROMORPHONE HYDROCHLORIDE 0.4 MG: 10 INJECTION, SOLUTION INTRAMUSCULAR; INTRAVENOUS; SUBCUTANEOUS at 16:10

## 2017-02-21 RX ADMIN — OXYCODONE HYDROCHLORIDE AND ACETAMINOPHEN 1 TABLET: 5; 325 TABLET ORAL at 21:45

## 2017-02-21 RX ADMIN — PHENYLEPHRINE HYDROCHLORIDE 200 MCG: 10 INJECTION, SOLUTION INTRAMUSCULAR; INTRAVENOUS; SUBCUTANEOUS at 13:54

## 2017-02-21 RX ADMIN — PANTOPRAZOLE SODIUM 40 MG: 40 INJECTION, POWDER, FOR SOLUTION INTRAVENOUS at 19:32

## 2017-02-21 RX ADMIN — SODIUM CHLORIDE, POTASSIUM CHLORIDE, SODIUM LACTATE AND CALCIUM CHLORIDE: 600; 310; 30; 20 INJECTION, SOLUTION INTRAVENOUS at 13:08

## 2017-02-21 RX ADMIN — ONDANSETRON 4 MG: 2 INJECTION INTRAMUSCULAR; INTRAVENOUS at 14:38

## 2017-02-21 RX ADMIN — OXYCODONE HYDROCHLORIDE AND ACETAMINOPHEN 1 TABLET: 5; 325 TABLET ORAL at 18:17

## 2017-02-21 RX ADMIN — DEXAMETHASONE SODIUM PHOSPHATE 8 MG: 4 INJECTION, SOLUTION INTRAMUSCULAR; INTRAVENOUS at 13:30

## 2017-02-21 RX ADMIN — PROPOFOL 180 MG: 10 INJECTION, EMULSION INTRAVENOUS at 13:18

## 2017-02-21 RX ADMIN — FENTANYL CITRATE 100 MCG: 50 INJECTION, SOLUTION INTRAMUSCULAR; INTRAVENOUS at 13:25

## 2017-02-21 RX ADMIN — PHENYLEPHRINE HYDROCHLORIDE 100 MCG: 10 INJECTION, SOLUTION INTRAMUSCULAR; INTRAVENOUS; SUBCUTANEOUS at 13:33

## 2017-02-21 RX ADMIN — SENNOSIDES AND DOCUSATE SODIUM 1 TABLET: 8.6; 5 TABLET ORAL at 19:39

## 2017-02-21 RX ADMIN — PANTOPRAZOLE SODIUM 40 MG: 40 INJECTION, POWDER, FOR SOLUTION INTRAVENOUS at 07:44

## 2017-02-21 RX ADMIN — ERTAPENEM SODIUM 1 G: 1 INJECTION, POWDER, LYOPHILIZED, FOR SOLUTION INTRAMUSCULAR; INTRAVENOUS at 17:32

## 2017-02-21 RX ADMIN — PHENYLEPHRINE HYDROCHLORIDE 100 MCG: 10 INJECTION, SOLUTION INTRAMUSCULAR; INTRAVENOUS; SUBCUTANEOUS at 13:38

## 2017-02-21 RX ADMIN — FENTANYL CITRATE 100 MCG: 50 INJECTION, SOLUTION INTRAMUSCULAR; INTRAVENOUS at 13:18

## 2017-02-21 RX ADMIN — PHENYLEPHRINE HYDROCHLORIDE 100 MCG: 10 INJECTION, SOLUTION INTRAMUSCULAR; INTRAVENOUS; SUBCUTANEOUS at 14:02

## 2017-02-21 RX ADMIN — ROCURONIUM BROMIDE 50 MG: 10 INJECTION INTRAVENOUS at 13:19

## 2017-02-21 RX ADMIN — Medication 3.5 MG: at 14:45

## 2017-02-21 RX ADMIN — PHENYLEPHRINE HYDROCHLORIDE 100 MCG: 10 INJECTION, SOLUTION INTRAMUSCULAR; INTRAVENOUS; SUBCUTANEOUS at 13:50

## 2017-02-21 RX ADMIN — PHENYLEPHRINE HYDROCHLORIDE 100 MCG: 10 INJECTION, SOLUTION INTRAMUSCULAR; INTRAVENOUS; SUBCUTANEOUS at 13:35

## 2017-02-21 RX ADMIN — MIDAZOLAM HYDROCHLORIDE 2 MG: 1 INJECTION, SOLUTION INTRAMUSCULAR; INTRAVENOUS at 13:08

## 2017-02-21 RX ADMIN — SODIUM CHLORIDE, POTASSIUM CHLORIDE, SODIUM LACTATE AND CALCIUM CHLORIDE: 600; 310; 30; 20 INJECTION, SOLUTION INTRAVENOUS at 15:00

## 2017-02-21 RX ADMIN — CEFAZOLIN SODIUM 2 G: 2 INJECTION, SOLUTION INTRAVENOUS at 13:27

## 2017-02-21 RX ADMIN — ACETAMINOPHEN 1000 MG: 10 INJECTION, SOLUTION INTRAVENOUS at 06:47

## 2017-02-21 RX ADMIN — LIDOCAINE HYDROCHLORIDE 60 MG: 20 INJECTION, SOLUTION INFILTRATION; PERINEURAL at 13:18

## 2017-02-21 RX ADMIN — PHENYLEPHRINE HYDROCHLORIDE 100 MCG: 10 INJECTION, SOLUTION INTRAMUSCULAR; INTRAVENOUS; SUBCUTANEOUS at 13:30

## 2017-02-21 RX ADMIN — HYDROMORPHONE HYDROCHLORIDE 0.5 MG: 10 INJECTION, SOLUTION INTRAMUSCULAR; INTRAVENOUS; SUBCUTANEOUS at 02:50

## 2017-02-21 ASSESSMENT — PAIN DESCRIPTION - DESCRIPTORS
DESCRIPTORS: ACHING
DESCRIPTORS: ACHING

## 2017-02-21 NOTE — BRIEF OP NOTE
[facility], [place]    Brief Operative Note    Pre-operative diagnosis: Acute Appendicitis   Post-operative diagnosis Same  Procedure: Procedure(s):  Laparoscopic Appendectomy   - Wound Class: III-Contaminated  Surgeon: Surgeon(s) and Role:     * Talon Hi MD - Primary     * Ben Duff MD - Resident - Assisting  Anesthesia: General   Estimated blood loss: 5 mL  Drains: None  Specimens:   ID Type Source Tests Collected by Time Destination   A : Appendix sent permenant Organ Appendix SURGICAL PATHOLOGY EXAM Talon Hi MD 2/21/2017  2:41 PM      Findings: Markedly inflamed appendix  Complications: None immediately

## 2017-02-21 NOTE — PROGRESS NOTES
Pre-op calling for pt-RN notified pt has not been consented and has not yet completed surgical scrub. Pre-op checklist has been started. Transport here to escort pt to pre-op.

## 2017-02-21 NOTE — H&P
[facility] ED Observation Admission Note    Chief Complaint   Patient presents with     Abdominal Pain       Assessment/Plan:  1. Abdominal Pain, Nausea, Vomiting: Epigastric abdominal pain since last night, now localized more in lower abdomen. Had associated nausea, 1 episode of non bloody emesis. Felt worse this morning and went to [place]; X rays and labs were done. Referred to ED with concern for possible ruptured appendix. 6 small hard stools overnight. Denies dysuria, hematuria, lightheadedness, dizziness, rhinorrhea, fever, chills, cough. No history of abdominal surgeries. LMP 2/5/17. In ED, HR 92, -132/79-86, RR 18, SaO2 % on RA, Temp 97.9. Labs show normal CMP except glucose of 130. Normal lipase. CBC with WBC of 14.4, abs neutrophil 13.5, abs lymphocytes 0.5 otherwise normal. UA negative except SG of 1.038. Hcg negative. She was complaining of pain in the epigastric area but had tenderness in the periumbilical area and LLQ > RLQ in ED. Normal abdominal ultrasound. CT abdomen pelvis reports appendix is significantly distended with stool, measuring 2.2cm in diameter however otherwise normal without inflammatory changes or wall thickening but with air in distal appendix; trace retrouterine free fluid, likely physiologic. In the ED the patient was given dilaudid 0.5mg IV x 3, 1L NS bolus. She was seen by General Surgery who recommended Observation Unit admission for serial abdominal exams overnight and repeat CBC in the morning as well. On admission to the observation unit the patient was complaining of her abdominal pain getting worse and having nausea. She states the abdominal pain is now in the lower abdomen, 8/10.   - Appreciate General Surgery consult  - Serial abdominal exams  - IVF with LR at 125ml/hr  - Clear liquids now and NPO after midnight  - Protonix 40mg IV BID  - Pericolace 1-2 tabs BID  - Tylenol prn pain or fever  - Judicious use of narcotics for pain  - Add on CRP to previous labs  -  Repeat CBC, CRP, CMP in AM    2. Sore Throat: Sore throat that just started on Obs Unit. Mild erythema of OP.  - Send Rapid Strep and Throat culture      HPI:    Karthikeyan Abreu is a 21 year old female with no significant PMH who presented to the ED with abdominal pain. Last night was about to go to bed and started having epigastric pain. She took some Tums thinking it was gas related or reflux. However this did not help. Woke up in the middle of the night with pain and nausea but no emesis. Upon awakening this morning abdominal pain had worsened. She continued to feel nauseated and had one episode of non bloody emesis. She went to [place] and X rays were taken and labs were drawn. She was referred to the ED with concern for possible ruptured appendix. Had a BM this morning with hard stool. She reports she had about 5 stools overnight as well however they were all small hard stools. Denies any dysuria, hematuria, lightheadedness, dizziness, rhinorrhea, fever, chills, cough. Denies any history of abdominal surgeries. LMP 2/5/17.     In the ED, HR 92, -132/79-86, RR 18, SaO2 % on RA, Temp 97.9. Labs show normal CMP except glucose of 130. Normal lipase. CBC with WBC of 14.4, abs neutrophil 13.5, abs lymphocytes 0.5 otherwise normal. UA negative except SG of 1.038. Hcg negative. She was complaining of pain in the epigastric area but had tenderness in the periumbilical area and LLQ > RLQ in ED. Normal abdominal ultrasound. CT abdomen pelvis reports appendix is significantly distended with stool, measuring 2.2cm in diameter however otherwise normal without inflammatory changes or wall thickening but with air in distal appendix; trace retrouterine free fluid, likely physiologic. In the ED the patient was given dilaudid 0.5mg IV x 3, 1L NS bolus. She was seen by General Surgery who recommended Observation Unit admission for serial abdominal exams overnight and repeat CBC in the morning as well.     On  admission to the observation unit the patient was complaining of her abdominal pain getting worse and having nausea. She states the abdominal pain is now in the lower abdomen, 8/10. She also reports a sore throat that just started.     History:    History reviewed. No pertinent past medical history.    History reviewed. No pertinent past surgical history.    No family history on file.    Social History     Social History     Marital status: Single     Spouse name: N/A     Number of children: N/A     Years of education: N/A     Occupational History     Not on file.     Social History Main Topics     Smoking status: Not on file     Smokeless tobacco: Not on file     Alcohol use Not on file     Drug use: Not on file     Sexual activity: Not on file     Other Topics Concern     Not on file     Social History Narrative     No narrative on file         No current facility-administered medications on file prior to encounter.   No current outpatient prescriptions on file prior to encounter.    Data:    Results for orders placed or performed during the hospital encounter of 02/20/17   US Abdomen Limited    Narrative    EXAMINATION: US ABDOMEN LIMITED, 2/20/2017 3:14 PM     COMPARISON: None.    HISTORY: Right upper quadrant pain. Normal LFTs.    FINDINGS:   Fluid: No evidence of ascites or pleural effusions.    Liver: The liver demonstrates normal echotexture measuring 17.6 cm in  clinical dimension. Somewhat prominent portal triads, likely related  to technique. There is no focal mass.     Gallbladder: There is no wall thickening, pericholecystic fluid,  positive sonographic Harrington's sign or evidence for cholelithiasis.    Bile Ducts: Both the intra- and extrahepatic biliary system are of  normal caliber.  The common bile duct measures 3 mm in diameter.    Pancreas: Visualized portions of the head and body of the pancreas are  unremarkable.     Kidney: The right kidney measures 11.0 cm long. There is no  hydronephrosis or  hydroureter, no shadowing renal calculi, cystic  lesion or mass.       Impression    IMPRESSION: No US findings to explain patient's right upper quadrant  pain.     I have personally reviewed the examination and initial interpretation  and I agree with the findings.    ROBERT BURGOS MD   CT Abdomen Pelvis w Contrast    Narrative    EXAMINATION: CT ABDOMEN PELVIS W CONTRAST, 2/20/2017 4:18 PM    TECHNIQUE:  Helical CT images from the lung bases through the  symphysis pubis were obtained with IV contrast. Contrast dose: 120 mL  of Isovue-370.    COMPARISON: Abdominal ultrasound performed the same day.    HISTORY: Diffuse abdominal pain.    FINDINGS:    Abdomen and pelvis: Mild hypoattenuation along the ligamentum teres  likely representing focal fat deposition. The liver is otherwise  unremarkable. The gallbladder, adrenal glands, kidneys, pancreas and  spleen are within normal limits. Punctate splenule posterior to the  spleen. The stomach, small bowel and colon are within normal limits.  The appendix is significantly dilated measuring 2.2 cm in diameter.  However, there is no wall thickening. There is air within the distal  appendix. There is no periappendiceal inflammatory stranding. No  extraluminal air. No suspicious adenopathy.    The bladder is mildly distended and otherwise unremarkable. Trace  retrouterine free fluid. The adnexa are within normal limits for age  with a 1.4 cm right ovarian cyst, likely a dominant follicle.    Lung bases: Lung bases are clear.    Bones and soft tissues: The soft tissues are within normal limits. No  suspicious osseous lesions.      Impression    IMPRESSION:   1. The appendix is significantly distended with stool. However, it  otherwise appears normal without inflammatory changes. Findings are  probably normal.     2. Trace retrouterine free fluid, likely physiologic.     Results of dilated appendix were discussed with the ordering physician  Dr. Osmany Bowers at 4:40 PM  on 2/20/2017 by Dr. Jones.    I have personally reviewed the examination and initial interpretation  and I agree with the findings.    ROBERT BURGOS MD   CBC with platelets differential   Result Value Ref Range    WBC 14.4 (H) 4.0 - 11.0 10e9/L    RBC Count 4.67 3.8 - 5.2 10e12/L    Hemoglobin 13.9 11.7 - 15.7 g/dL    Hematocrit 41.6 35.0 - 47.0 %    MCV 89 78 - 100 fl    MCH 29.8 26.5 - 33.0 pg    MCHC 33.4 31.5 - 36.5 g/dL    RDW 13.4 10.0 - 15.0 %    Platelet Count 233 150 - 450 10e9/L    Diff Method Automated Method     % Neutrophils 93.6 %    % Lymphocytes 3.3 %    % Monocytes 2.6 %    % Eosinophils 0.0 %    % Basophils 0.2 %    % Immature Granulocytes 0.3 %    Nucleated RBCs 0 0 /100    Absolute Neutrophil 13.5 (H) 1.6 - 8.3 10e9/L    Absolute Lymphocytes 0.5 (L) 0.8 - 5.3 10e9/L    Absolute Monocytes 0.4 0.0 - 1.3 10e9/L    Absolute Eosinophils 0.0 0.0 - 0.7 10e9/L    Absolute Basophils 0.0 0.0 - 0.2 10e9/L    Abs Immature Granulocytes 0.0 0 - 0.4 10e9/L    Absolute Nucleated RBC 0.0    Comprehensive metabolic panel   Result Value Ref Range    Sodium 140 133 - 144 mmol/L    Potassium 3.7 3.4 - 5.3 mmol/L    Chloride 105 96 - 110 mmol/L    Carbon Dioxide 26 20 - 32 mmol/L    Anion Gap 9 3 - 14 mmol/L    Glucose 130 (H) 70 - 99 mg/dL    Urea Nitrogen 8 7 - 30 mg/dL    Creatinine 0.66 0.50 - 1.00 mg/dL    GFR Estimate >90  Non  GFR Calc   >60 mL/min/1.7m2    GFR Estimate If Black >90   GFR Calc   >60 mL/min/1.7m2    Calcium 9.1 8.5 - 10.1 mg/dL    Bilirubin Total 0.6 0.2 - 1.3 mg/dL    Albumin 4.3 3.4 - 5.0 g/dL    Protein Total 8.4 6.8 - 8.8 g/dL    Alkaline Phosphatase 77 40 - 150 U/L    ALT 20 0 - 50 U/L    AST 14 0 - 35 U/L   Lipase   Result Value Ref Range    Lipase 118 73 - 393 U/L   HCG qualitative   Result Value Ref Range    HCG Qualitative Serum Negative NEG        ROS:    Review Of Systems  Skin: negative  Eyes: negative  Ears/Nose/Throat: positive for persistent  sore throat, negative for, nasal congestion, sneezing, sinus trouble  Respiratory: No shortness of breath, dyspnea on exertion, cough, or hemoptysis  Cardiovascular: negative  Gastrointestinal: positive for nausea, vomiting, abdominal pain, excessive gas or bloating and constipation, negative for, heartburn, reflux, hematemesis, melena, hematochezia and diarrhea  Genitourinary: negative  Musculoskeletal: negative  Neurologic: negative  Psychiatric: negative  Hematologic/Lymphatic/Immunologic: negative for, chills and fever  Endocrine: negative  PCP: None    10 point ROS negative other than the symptoms noted above.      Exam:  Vitals:  B/P: 127/83, T: 97.9, P: 92, R: 18    Constitutional: alert, no distress, cooperative and over weight  Head: Normocephalic. No masses, lesions, tenderness or abnormalities  Neck: Neck supple. No adenopathy. Thyroid symmetric, normal size,, Carotids without bruits.  ENT: Mild erythema of OP, no neck nodes or sinus tenderness  Cardiovascular: negative, PMI normal. No lifts, heaves, or thrills. RRR. No murmurs, clicks gallops or rub  Respiratory: negative, Percussion normal. Good diaphragmatic excursion. Lungs clear  Gastrointestinal: Abdomen soft, epigastric tenderness, LLQ > RLQ tenderness. No periumbilical tenderness. Negative Psoas sign, Rovsings sign, Mcburneys point tenderness. No rebound or guarding. BS hyperactive. No masses, organomegaly  : Deferred  Musculoskeletal: extremities normal- no gross deformities noted, gait normal and normal muscle tone  Skin: no suspicious lesions or rashes  Neurologic: Gait normal. Reflexes normal and symmetric. Sensation grossly WNL.  Psychiatric: mentation appears normal and affect normal/bright  Hematologic/Lymphatic/Immunologic: normal ant/post cervical, axillary, supraclavicular and inguinal nodes    Consults: general surgery  FEN: clear liquid diet; npo after midnight  DVT prophylaxis: encourage ambulation; expect short stay  GI  prophylaxis: protonix  BM prophylaxis: pericolace  Code Status: full code  Disposition: home if serial abdominal exams normal, labs improved, vitals stable, general surgery consult completed with dispo plan    Signed:  Luis Myers PA-C   February 20, 2017 at 7:52 PM

## 2017-02-21 NOTE — ANESTHESIA PREPROCEDURE EVALUATION
ANESTHESIA PREOP EVALUATION    NPO Status: more then 6 hours    Procedure: Laparoscopic Appendectomy     HPI:     PMHx/PSHx/ROS:  PAST MEDICAL HISTORY: History reviewed. No pertinent past medical history.    PAST SURGICAL HISTORY: History reviewed. No pertinent past surgical history.    FAMILY HISTORY: History reviewed. No pertinent family history.      Past Anes Hx: No personal or family h/o anesthesia problems      Allergies: No Known Allergies    Meds:   No prescriptions prior to admission.       No current outpatient prescriptions on file.       Physical Exam:  VS: T 98.4, P 90, /60, R 16, SpO2 98%   MP1, TM distance >3FB, mouth opening adequate, full ROM, intact dentition.      BMP:  Lab Results   Component Value Date     02/21/2017      Lab Results   Component Value Date    POTASSIUM 3.8 02/21/2017     Lab Results   Component Value Date    CHLORIDE 103 02/21/2017     Lab Results   Component Value Date    DEBBIE 8.6 02/21/2017     Lab Results   Component Value Date    CO2 24 02/21/2017     Lab Results   Component Value Date    BUN 6 02/21/2017     Lab Results   Component Value Date    CR 0.56 02/21/2017     Lab Results   Component Value Date     02/21/2017        CBC:  Lab Results   Component Value Date    WBC 16.6 02/21/2017     Lab Results   Component Value Date    HGB 12.7 02/21/2017     Lab Results   Component Value Date    HCT 37.7 02/21/2017     Lab Results   Component Value Date     02/21/2017        Coags/Type and Screen  No results found for: INR  No results found for: PT  Type and Screen:                         Anesthesia Plan      History & Physical Review  History and physical reviewed and following examination; no interval change.    ASA Status:  1 emergent.    NPO Status:  > 8 hours    Plan for General and ETT with Intravenous induction. Maintenance will be Balanced.           Postoperative Care      Consents  Anesthetic plan, risks, benefits and alternatives discussed  with:  Patient..          Lianne Rodríguez MD    2/21/2017  2:29 PM                .

## 2017-02-21 NOTE — PROGRESS NOTES
Patient arrived to unit 6D via litter from the [facility]. She independently scooted self from litter to bed. Afterwards, patient stated that she felt nauseated; patient was supplied with an emesis bag. Patient also stated that she was having more mid abdominal pain, verbalizing it as a pressure like pain. PA was notified. Patient denies any SOB or other discomfort aside from abdominal pain. She is alert and oriented x 4 and able to make needs known. Will continue to monitor.

## 2017-02-21 NOTE — ED NOTES
Patient received as signed out at change of shift pending surgery consultation.  General surgery has seen and evaluated the patient.  General Surgery requests admission to the ED observation unit for serial abdominal exams and recheck of white blood cell count in the morning.  Patient is agreeable with plan.  Accepted for admission under the abdominal pain protocol by ED observation unit.     Arsh Mcmahan MD  02/20/17 1218

## 2017-02-21 NOTE — PROVIDER NOTIFICATION
[facility] notified that pt was c/o abdominal pain. Reports that it is not getting worse, controlled with pain management. Pt also reports that PIV is hurting, vascular access consult ordered. IV tylenol ordered, will administer as soon as possible. Will continue to monitor. NPO status.

## 2017-02-21 NOTE — ANESTHESIA POSTPROCEDURE EVALUATION
Patient: Karthikeyan Abreu    Procedure(s):  Laparoscopic Appendectomy   - Wound Class: III-Contaminated    Diagnosis:Acute Appendicitis   Diagnosis Additional Information: No value filed.    Anesthesia Type:  General    Note:  Anesthesia Post Evaluation    Patient location during evaluation: PACU  Patient participation: Able to fully participate in evaluation  Level of consciousness: awake and alert  Pain management: adequate  Airway patency: patent  Cardiovascular status: acceptable and hemodynamically stable  Respiratory status: acceptable  Hydration status: acceptable  PONV: none     Anesthetic complications: None          Last vitals:  Vitals:    02/21/17 1505 02/21/17 1510 02/21/17 1520   BP: 126/81 130/78 139/86   Pulse:      Resp:  16 16   Temp: 37.4  C (99.3  F)  36.6  C (97.8  F)   SpO2: 100% (!) 81% 100%         Electronically Signed By: Akira Talley MD  February 21, 2017  3:38 PM

## 2017-02-21 NOTE — PLAN OF CARE
Problem: Discharge Planning  Goal: Discharge Planning (Adult, OB, Behavioral, Peds)  Outpatient/Observation goals to be met before discharge home:     - Diagnostic tests and consults completed and resulted: yes  - Vital signs normal or at patient baseline: yes  - Tolerating oral intake to maintain hydration: pt offered clear liquids  - Adequate pain control on oral analgesia: pt given dose of IV dilaudid post procedure  - Serial abdominal exams improved and not worse: pt s/p appendectomy. 3 lap site CDI.    - Surgery Team has dispo plans on patient: pt s/p appendectomy

## 2017-02-21 NOTE — OP NOTE
DATE OF SURGERY:  02/21/2017      PREOPERATIVE DIAGNOSIS:  Acute appendicitis.      POSTOPERATIVE DIAGNOSIS:  Acute appendicitis.      PROCEDURE:  Laparoscopic appendectomy.      ANESTHESIA:  General endotracheal.      STAFF SURGEON:  Talon Hi MD      ASSISTANT SURGEON:  Ben Duff MD      ESTIMATED BLOOD LOSS:  5 mL.      DESCRIPTION OF PROCEDURE:  After informed consent was obtained, Karthikeyan Abreu was brought to the operating room and placed on the table in supine position.  General endotracheal anesthesia was induced without difficulty.  A Witt catheter was placed.  All pressure points were adequately cushioned.  Bilateral pneumatic compression devices were applied to patient's lower extremities.  Appropriate perioperative antibiotics were administered.  The patient's abdomen was prepped and draped in the usual sterile fashion.  A timeout was performed with all operative personnel who confirmed the appropriate patient, procedure and operative site.        A small transverse infraumbilical incision was made.  Blunt dissection was used to expose the fascia.  It was then grasped between 2 Kocher clamps and incised sharply.  The abdomen was entered under direct visualization.  A 12 mm balloon port was then placed in the abdomen.  Pneumoperitoneum was established to maximum pressure of 15 mmHg.  Two additional working ports were placed under direct visualization.  There was a 5 mm working port in both the suprapubic area as well as left lower quadrant.      The appendix was visualized in the patient's right lower quadrant.  It was found to be markedly inflamed.  There was no evidence of gross perforation, but there was some murky fluid in the right paracolic gutter as well as the pelvis.  The appendix was grasped and retracted.  A window was created at the base of the appendix.  Using a blue load on the Endo-NEO stapler, we transected the appendix at its base where it joined the cecum.  Next, we  used 2 white loads on the Endo-NEO stapler to divide the mesoappendix.        The patient's right lower quadrant and pelvis were irrigated with warm saline and aspirated.  Both staple lines were hemostatic.  The ports were removed under direct visualization.  Once the pneumoperitoneum was released, we closed the umbilical port site fascia with an interrupted 0 Vicryl figure-of-8 suture.  The skin was closed with 4-0 Monocryl.  Dermabond was applied.      All sponge, instrument and needle counts were correct as reported to me.      The patient tolerated the procedure well with no immediate complications.  She was extubated without difficulty and transferred to recovery room in stable condition.        Dr. Olson was present for the entire procedure.         ROBINA OLSON MD       As dictated by RAISSA ROLAND MD            D: 2017 16:54   T: 2017 17:51   MT:       Name:     HECTOR NUNEZ   MRN:      [id number]        Account:        [id number]   :      1995           Procedure Date: 2017      Document: [id number]

## 2017-02-21 NOTE — PROGRESS NOTES
General Surgery Progress note    S:  Patient seen overnight several times with no change in exam. WBC increased from yesterday. This morning patient was having similar pain. No fevers. No n/v    O:  Vitals:    02/21/17 0746 02/21/17 0823 02/21/17 1122 02/21/17 1211   BP: 115/66  117/66 109/60   BP Location:  Right arm Left arm    Pulse:       Resp: 18 18 14 16   Temp: 99.1  F (37.3  C) 98.2  F (36.8  C) 98.1  F (36.7  C) 98.4  F (36.9  C)   TempSrc: Oral Oral Oral Oral   SpO2: 97% 98%     Weight:       Height:           A&Ox3, NAD  Breathing non-labored  RRR  Soft, ND, minimally ttp over the suprapubic area. No peritonitis   Distal extremities warm.     BMP  Recent Labs  Lab 02/21/17  0730 02/20/17  1413    140   POTASSIUM 3.8 3.7   CHLORIDE 103 105   DEBBIE 8.6 9.1   CO2 24 26   BUN 6* 8   CR 0.56 0.66   * 130*     CBC  Recent Labs  Lab 02/21/17  0750 02/20/17  1413   WBC 16.6* 14.4*   RBC 4.25 4.67   HGB 12.7 13.9   HCT 37.7 41.6   MCV 89 89   MCH 29.9 29.8   MCHC 33.7 33.4   RDW 13.4 13.4    233     INRNo lab results found in last 7 days.        A/P: Karthikeyan Abreu is a 21 year old female with 2 day history of abdominal pain, leukocytosis. CT scan with dilated appendix    Given increased WBC, CT scan findings. Plan to go to OR for laparoscopic appendectomy.     Pricilla Michel MD  PGY-1, General Surgery  [phone]

## 2017-02-21 NOTE — PROGRESS NOTES
"SURGERY CROSS-COVER NOTE  2/20/2017 9:11 PM     Patient: Karthikeyan Abreu  MRN: [id number]    Subjective  Assessed patient for serial abdominal exams overnight for concern for appendicitis. Patient reports pain is unchanged; mostly in the epigastrium, waxes and wanes. Pain meds are helping.    Objective  /86  Pulse 92  Temp 97.9  F (36.6  C) (Oral)  Resp 18  Ht 1.702 m (5' 7\")  Wt 88.9 kg (196 lb)  LMP 02/05/2017 (Exact Date)  SpO2 100%  Breastfeeding? No  BMI 30.7 kg/m2     General: AAO, NAD, lying in bed, appears comfortable  Abd: soft, nondistended, mildly tender to moderate palpation of epigastrium, very mildly tender to deep palpation of LLQ and RLQ, nontender elsewhere, no rebound or guarding, negative Psoas and obturator sign    Assessment/Plan:  Abdominal exam unremarkable, no signs of peritonitis. Will continue serial abdominal exams.    Ok to have sips of water for comfort until midnight, then NPO. Would recommend IVFs while NPO.      - - - - - - - - - - - - - - - - - -  Lianne Khan MD  General Surgery PGY-1  "

## 2017-02-21 NOTE — PLAN OF CARE
Problem: Discharge Planning  Goal: Discharge Planning (Adult, OB, Behavioral, Peds)  Outpatient/Observation goals to be met before discharge home:     - Diagnostic tests and consults completed and resulted: in process    - Vital signs normal or at patient baseline: yes  - Tolerating oral intake to maintain hydration: pt NPO, IVF infusing   - Adequate pain control on oral analgesia: pt currently denies pain   - Serial abdominal exams improved and not worse: yes    - Surgery Team has dispo plans on patient: in process

## 2017-02-21 NOTE — ANESTHESIA CARE TRANSFER NOTE
Patient: Karthikeyan Abreu    Procedure(s):  Laparoscopic Appendectomy   - Wound Class: III-Contaminated    Diagnosis: Acute Appendicitis   Diagnosis Additional Information: No value filed.    Anesthesia Type:   General     Note:  Airway :Face Mask  Patient transferred to:PACU  Comments: VSS. Report to RN. 126/81. 100%. HR 86.      Vitals: (Last set prior to Anesthesia Care Transfer)    CRNA VITALS  2/21/2017 1440 - 2/21/2017 1510      2/21/2017             Resp Rate (set): 10                Electronically Signed By: SAPNA Bates CRNA  February 21, 2017  3:10 PM

## 2017-02-21 NOTE — PLAN OF CARE
"Problem: Discharge Planning  Goal: Discharge Planning (Adult, OB, Behavioral, Peds)  Outpatient/Observation goals to be met before discharge home:      - Diagnostic tests and consults completed and resulted - strep test neg. Continuing serial abd exam - no changes.  - Vital signs normal or at patient baseline - /73  Pulse 90  Temp 99.3  F (37.4  C) (Oral)  Resp 16  Ht 1.702 m (5' 7\")  Wt 88.9 kg (196 lb)  LMP 02/05/2017 (Exact Date)  SpO2 100%  Breastfeeding? No  BMI 30.7 kg/m2  - Tolerating oral intake to maintain hydration - pt NPO  - Adequate pain control on oral analgesia - yes currently denies pain  - Serial abdominal exams improved and not worse - lower abd pain currently controlled with pain medications - IV dilaudid helping with pain management.  - Surgery Team has dispo plans on patient - pending          "

## 2017-02-21 NOTE — PLAN OF CARE
"Problem: Discharge Planning  Goal: Discharge Planning (Adult, OB, Behavioral, Peds)  Outpatient/Observation goals to be met before discharge home:     - Diagnostic tests and consults completed and resulted - strep test neg. Continuing to do serial abd exams  - Vital signs normal or at patient baseline - /70  Pulse 90  Temp 98.4  F (36.9  C) (Oral)  Resp 16  Ht 1.702 m (5' 7\")  Wt 88.9 kg (196 lb)  LMP 02/05/2017 (Exact Date)  SpO2 100%  Breastfeeding? No  BMI 30.7 kg/m2  - Tolerating oral intake to maintain hydration - pt NPO  - Adequate pain control on oral analgesia - yes currently denies pain  - Serial abdominal exams improved and not worse - lower abd pain currently controlled with pain medications  - Surgery Team has dispo plans on patient - pending             "

## 2017-02-21 NOTE — PHARMACY-ADMISSION MEDICATION HISTORY
Admission medication history interview status for the 2/20/2017 admission is complete. See Epic admission navigator for allergy information, pharmacy, prior to admission medications and immunization status.     Medication history interview sources:  Patient    Changes made to PTA medication list (reason)  Added: none  Deleted: none  Changed: none    Additional medication history information (including reliability of information, actions taken by pharmacist):  *Patient confirms she takes no regular medications   *Per patient, has not received an influenza vaccine this season      Prior to Admission medications    Not on File         Medication history completed by: Jessica Carlisle, Pharmacy Intern

## 2017-02-21 NOTE — PROGRESS NOTES
"02/21/2017  General Surgery Cross Cover Note    Assessed patient for serial abdominal exams overnight given concern for appendicitis. Patient reports pain has improved, although continues to wax and wane.     Exam:   /70  Pulse 90  Temp 98.4  F (36.9  C) (Oral)  Resp 16  Ht 1.702 m (5' 7\")  Wt 88.9 kg (196 lb)  LMP 02/05/2017 (Exact Date)  SpO2 100%  Breastfeeding? No  BMI 30.7 kg/m2  General: Alert, interactive, & in NAD. Resting comfortably in bed.   Resp: Non-labored breathing on room air.   Abdomen: Soft, non-distended, epigastrium non-tender to palpation, LLQ and RLQ mildly tender to deep palpation. No rebound, no guarding. No peritoneal findings.     Assessment:  Karthikeyan Abreu is a 21 year old female with a one day history of abdominal pain. Hemodynamically stable with benign abdominal exam without signs of peritonitis.     Plan:  -No acute intervention needed at this time.   -Continue to monitor.     Ondina Perkins MD  General Surgery PGY-1  [phone]  (After 6AM please page primary team)      "

## 2017-02-21 NOTE — DISCHARGE SUMMARY
"General Surgery Surgery Discharge Summary    Karthikeyan Abreu MRN# [id number]   YOB: 1995 Age: 21 year old     Date of Admission:  2/20/2017  Date of Discharge::  2/21/2017  Admitting Physician:  Efren Cook MD  Discharge Physician:  [unfilled]  Primary Care Physician:        No Ref-Primary, Physician          Admission Diagnoses:   Abdominal pain, generalized [R10.84]          Discharge Diagnosis:     Acute appendicitis          Procedures:   Laparoscopic appendicitis         Non-operative procedures:   None performed          Consultations:   MEDICATION HISTORY IP PHARMACY CONSULT  SURGERY GENERAL ADULT IP CONSULT  VASCULAR ACCESS CARE ADULT IP CONSULT           Medications Prior to Admission:     No prescriptions prior to admission.            Discharge Medications:      Karthikeyan Abreu   Home Medication Instructions MELINA:[id number]    Printed on:02/21/17 7922   Medication Information                      oxyCODONE-acetaminophen (PERCOCET) 5-325 MG per tablet  Take 1-2 tablets by mouth every 4 hours as needed for pain (moderate to severe)             senna-docusate (SENOKOT-S;PERICOLACE) 8.6-50 MG per tablet  Take 1-2 tablets by mouth 2 times daily                       Day of Discharge Exam   /78 (BP Location: Left arm)  Pulse 90  Temp 98.1  F (36.7  C) (Oral)  Resp 18  Ht 1.702 m (5' 7\")  Wt 88.9 kg (196 lb)  LMP 02/05/2017 (Exact Date)  SpO2 97%  Breastfeeding? No  BMI 30.7 kg/m2  General: alert and oriented, NAD  Pulm: lung sounds CTAB, breathing comfortably on room air  CV: RRR, peripheral pulses 2+   Abd: soft, appropriately tender to palpation, non-distended  Extremities: warm, well-perfused  Incisions: c/d/i, incisions with dermabond, no erythema          Brief History of Illness:   Karthikeyan Abreu is a 21 year old female with one day history of abdominal pain, n/v. Work up significant for leukocytosis and dilated appendix           Hospital Course:   Postoperatively " the patient was transferred to the general floor for further cares.   On 2/21, they were felt to meet discharge criteria and were discharged to home with appropriate instructions and follow up.  They were tolerating regular diet, had full return of bowel and bladder function, and were ambulating independently.  The patient acknowledged understanding and were in agreement with the plan.         Antibiotics Prescribed at Discharge:   None prescribed           Imaging Studies:   No studies require specific follow-up  Results for orders placed or performed during the hospital encounter of 02/20/17   US Abdomen Limited    Narrative    EXAMINATION: US ABDOMEN LIMITED, 2/20/2017 3:14 PM     COMPARISON: None.    HISTORY: Right upper quadrant pain. Normal LFTs.    FINDINGS:   Fluid: No evidence of ascites or pleural effusions.    Liver: The liver demonstrates normal echotexture measuring 17.6 cm in  clinical dimension. Somewhat prominent portal triads, likely related  to technique. There is no focal mass.     Gallbladder: There is no wall thickening, pericholecystic fluid,  positive sonographic Harrington's sign or evidence for cholelithiasis.    Bile Ducts: Both the intra- and extrahepatic biliary system are of  normal caliber.  The common bile duct measures 3 mm in diameter.    Pancreas: Visualized portions of the head and body of the pancreas are  unremarkable.     Kidney: The right kidney measures 11.0 cm long. There is no  hydronephrosis or hydroureter, no shadowing renal calculi, cystic  lesion or mass.       Impression    IMPRESSION: No US findings to explain patient's right upper quadrant  pain.     I have personally reviewed the examination and initial interpretation  and I agree with the findings.    ROBERT BURGOS MD   CT Abdomen Pelvis w Contrast    Narrative    EXAMINATION: CT ABDOMEN PELVIS W CONTRAST, 2/20/2017 4:18 PM    TECHNIQUE:  Helical CT images from the lung bases through the  symphysis pubis were  obtained with IV contrast. Contrast dose: 120 mL  of Isovue-370.    COMPARISON: Abdominal ultrasound performed the same day.    HISTORY: Diffuse abdominal pain.    FINDINGS:    Abdomen and pelvis: Mild hypoattenuation along the ligamentum teres  likely representing focal fat deposition. The liver is otherwise  unremarkable. The gallbladder, adrenal glands, kidneys, pancreas and  spleen are within normal limits. Punctate splenule posterior to the  spleen. The stomach, small bowel and colon are within normal limits.  The appendix is significantly dilated measuring 2.2 cm in diameter.  However, there is no wall thickening. There is air within the distal  appendix. There is no periappendiceal inflammatory stranding. No  extraluminal air. No suspicious adenopathy.    The bladder is mildly distended and otherwise unremarkable. Trace  retrouterine free fluid. The adnexa are within normal limits for age  with a 1.4 cm right ovarian cyst, likely a dominant follicle.    Lung bases: Lung bases are clear.    Bones and soft tissues: The soft tissues are within normal limits. No  suspicious osseous lesions.      Impression    IMPRESSION:   1. The appendix is significantly distended with stool. However, it  otherwise appears normal without inflammatory changes. Findings are  probably normal.     2. Trace retrouterine free fluid, likely physiologic.     Results of dilated appendix were discussed with the ordering physician  Dr. Osmany Bowers at 4:40 PM on 2/20/2017 by Dr. Jones.    I have personally reviewed the examination and initial interpretation  and I agree with the findings.    ROBERT BURGOS MD            Final Pathology Result:   Pending at time of discharge         Discharge Instructions and Follow-Up:      No lifting    No lifting over 15 lbs and no strenuous physical activity for 4 weeks     Diet Instructions   Resume pre-procedure diet     Reason for your hospital stay   Acute appendicitis     Adult [facility]  Follow-up and recommended labs and tests   Follow up in 2  weeks with Dr. Talon [facility] at [facility]  [address] [place] [address]    Contact Brea Maza at [phone] for help scheduling and any questions.    If you have fever, increased abdominal pain, vomiting, or foul smelling discharge from incision sites, you can call number above during weekdays.  On weekends or at night call  [phone] and ask for surgery resident on call.    Appointments on [facility] and/or [facility] (with [facility] or [facility] provider or service). Call [phone] if you haven't heard regarding these appointments within 7 days of discharge.               Home Health Care:   Not needed           Discharge Disposition:   Discharged to home      Condition at discharge: Stable    Pricilla Michel MD  General Surgery, PGY-1  [phone]

## 2017-02-21 NOTE — PROGRESS NOTES
Observation Unit Transfer Summary     Patient ID:  Karthikeyan Abreu  MRN: [id number]  21 year old  YOB: 1995    Observation Admit Date: 2/20/2017    ED Admitting Attending: Efren Cook MD    Transfer Date and Time: February 21, 2017 at 1:20 PM     Transferring Observation Provider: SAPNA Navas CNP    Admission Diagnoses:     1. Abdominal pain, generalized        Transfer Diagnoses:    Appendicitis    Emergency Department and Observation Course:     Karthikeyan Abreu is a 21 year old female with no significant PMH who presented to the ED with abdominal pain. Last night was about to go to bed and started having epigastric pain. She took some Tums thinking it was gas related or reflux. However this did not help. Woke up in the middle of the night with pain and nausea but no emesis. Upon awakening this morning abdominal pain had worsened. She continued to feel nauseated and had one episode of non bloody emesis. She went to [place] and X rays were taken and labs were drawn. She was referred to the ED with concern for possible ruptured appendix. Had a BM this morning with hard stool. She reports she had about 5 stools overnight as well however they were all small hard stools. Denies any dysuria, hematuria, lightheadedness, dizziness, rhinorrhea, fever, chills, cough. Denies any history of abdominal surgeries. LMP 2/5/17.  Labs show normal CMP except glucose of 130. Normal lipase. CBC with WBC of 14.4, abs neutrophil 13.5, abs lymphocytes 0.5 otherwise normal. UA negative except SG of 1.038. Hcg negative. She was complaining of pain in the epigastric area but had tenderness in the periumbilical area and LLQ > RLQ in ED. Normal abdominal ultrasound. CT abdomen pelvis reports appendix is significantly distended with stool, measuring 2.2cm in diameter however otherwise normal without inflammatory changes or wall thickening but with air in distal appendix; trace retrouterine free fluid, likely  "physiologic. In the ED the patient was given dilaudid 0.5mg IV x 3, 1L NS bolus. She was seen by General Surgery who recommended Observation Unit admission for serial abdominal exams overnight and repeat CBC in the morning as well.   On admission to the observation unit the patient was complaining of her abdominal pain getting worse and having nausea. She states the abdominal pain is now in the lower abdomen, 8/10.  In the morning, her abdominal pain had improved. She reported mild diffuse abdominal pain. Her white count increased to 16.6 with a low grade temperature. Surgery recommended an appendectomy.       At this time the patient has failed observation management due to the patient requiring any appendectomy and will be transferred to the surgery service.     Consults: surgery    DATA:    Transfer Exam:    /60  Pulse 90  Temp 98.4  F (36.9  C) (Oral)  Resp 16  Ht 1.702 m (5' 7\")  Wt 88.9 kg (196 lb)  LMP 02/05/2017 (Exact Date)  SpO2 98%  Breastfeeding? No  BMI 30.7 kg/m2  Constitutional: alert, no distress, cooperative and over weight  Head: Normocephalic. No masses, lesions, tenderness or abnormalities  Neck: Neck supple. No adenopathy. Thyroid symmetric, normal size,, Carotids without bruits.  ENT: Mild erythema of OP, no neck nodes or sinus tenderness  Cardiovascular: negative, PMI normal. No lifts, heaves, or thrills. RRR. No murmurs, clicks gallops or rub  Respiratory: negative, Percussion normal. Good diaphragmatic excursion. Lungs clear  Gastrointestinal: Abdomen soft, epigastric tenderness, LLQ > RLQ tenderness. No periumbilical tenderness. Negative Psoas sign, Rovsings sign, Mcburneys point tenderness. No rebound or guarding. BS hyperactive. No masses, organomegaly  : Deferred  Musculoskeletal: extremities normal- no gross deformities noted, gait normal and normal muscle tone  Skin: no suspicious lesions or rashes  Neurologic: Gait normal. Reflexes normal and symmetric. Sensation grossly " WNL.  Psychiatric: mentation appears normal and affect normal/bright  Hematologic/Lymphatic/Immunologic: normal ant/post cervical, axillary, supraclavicular and inguinal nodes    Current Medications:    No current outpatient prescriptions on file.       Medications Prior to Admission:    No prescriptions prior to admission.       Significant Diagnostic Studies:     Results for orders placed or performed during the hospital encounter of 02/20/17   US Abdomen Limited    Narrative    EXAMINATION: US ABDOMEN LIMITED, 2/20/2017 3:14 PM     COMPARISON: None.    HISTORY: Right upper quadrant pain. Normal LFTs.    FINDINGS:   Fluid: No evidence of ascites or pleural effusions.    Liver: The liver demonstrates normal echotexture measuring 17.6 cm in  clinical dimension. Somewhat prominent portal triads, likely related  to technique. There is no focal mass.     Gallbladder: There is no wall thickening, pericholecystic fluid,  positive sonographic Harrington's sign or evidence for cholelithiasis.    Bile Ducts: Both the intra- and extrahepatic biliary system are of  normal caliber.  The common bile duct measures 3 mm in diameter.    Pancreas: Visualized portions of the head and body of the pancreas are  unremarkable.     Kidney: The right kidney measures 11.0 cm long. There is no  hydronephrosis or hydroureter, no shadowing renal calculi, cystic  lesion or mass.       Impression    IMPRESSION: No US findings to explain patient's right upper quadrant  pain.     I have personally reviewed the examination and initial interpretation  and I agree with the findings.    ROBERT BURGOS MD   CT Abdomen Pelvis w Contrast    Narrative    EXAMINATION: CT ABDOMEN PELVIS W CONTRAST, 2/20/2017 4:18 PM    TECHNIQUE:  Helical CT images from the lung bases through the  symphysis pubis were obtained with IV contrast. Contrast dose: 120 mL  of Isovue-370.    COMPARISON: Abdominal ultrasound performed the same day.    HISTORY: Diffuse abdominal  pain.    FINDINGS:    Abdomen and pelvis: Mild hypoattenuation along the ligamentum teres  likely representing focal fat deposition. The liver is otherwise  unremarkable. The gallbladder, adrenal glands, kidneys, pancreas and  spleen are within normal limits. Punctate splenule posterior to the  spleen. The stomach, small bowel and colon are within normal limits.  The appendix is significantly dilated measuring 2.2 cm in diameter.  However, there is no wall thickening. There is air within the distal  appendix. There is no periappendiceal inflammatory stranding. No  extraluminal air. No suspicious adenopathy.    The bladder is mildly distended and otherwise unremarkable. Trace  retrouterine free fluid. The adnexa are within normal limits for age  with a 1.4 cm right ovarian cyst, likely a dominant follicle.    Lung bases: Lung bases are clear.    Bones and soft tissues: The soft tissues are within normal limits. No  suspicious osseous lesions.      Impression    IMPRESSION:   1. The appendix is significantly distended with stool. However, it  otherwise appears normal without inflammatory changes. Findings are  probably normal.     2. Trace retrouterine free fluid, likely physiologic.     Results of dilated appendix were discussed with the ordering physician  Dr. Osmany Bowers at 4:40 PM on 2/20/2017 by Dr. Jones.    I have personally reviewed the examination and initial interpretation  and I agree with the findings.    ROBERT BRUGOS MD   CBC with platelets differential   Result Value Ref Range    WBC 14.4 (H) 4.0 - 11.0 10e9/L    RBC Count 4.67 3.8 - 5.2 10e12/L    Hemoglobin 13.9 11.7 - 15.7 g/dL    Hematocrit 41.6 35.0 - 47.0 %    MCV 89 78 - 100 fl    MCH 29.8 26.5 - 33.0 pg    MCHC 33.4 31.5 - 36.5 g/dL    RDW 13.4 10.0 - 15.0 %    Platelet Count 233 150 - 450 10e9/L    Diff Method Automated Method     % Neutrophils 93.6 %    % Lymphocytes 3.3 %    % Monocytes 2.6 %    % Eosinophils 0.0 %    % Basophils 0.2  %    % Immature Granulocytes 0.3 %    Nucleated RBCs 0 0 /100    Absolute Neutrophil 13.5 (H) 1.6 - 8.3 10e9/L    Absolute Lymphocytes 0.5 (L) 0.8 - 5.3 10e9/L    Absolute Monocytes 0.4 0.0 - 1.3 10e9/L    Absolute Eosinophils 0.0 0.0 - 0.7 10e9/L    Absolute Basophils 0.0 0.0 - 0.2 10e9/L    Abs Immature Granulocytes 0.0 0 - 0.4 10e9/L    Absolute Nucleated RBC 0.0    Comprehensive metabolic panel   Result Value Ref Range    Sodium 140 133 - 144 mmol/L    Potassium 3.7 3.4 - 5.3 mmol/L    Chloride 105 96 - 110 mmol/L    Carbon Dioxide 26 20 - 32 mmol/L    Anion Gap 9 3 - 14 mmol/L    Glucose 130 (H) 70 - 99 mg/dL    Urea Nitrogen 8 7 - 30 mg/dL    Creatinine 0.66 0.50 - 1.00 mg/dL    GFR Estimate >90  Non  GFR Calc   >60 mL/min/1.7m2    GFR Estimate If Black >90   GFR Calc   >60 mL/min/1.7m2    Calcium 9.1 8.5 - 10.1 mg/dL    Bilirubin Total 0.6 0.2 - 1.3 mg/dL    Albumin 4.3 3.4 - 5.0 g/dL    Protein Total 8.4 6.8 - 8.8 g/dL    Alkaline Phosphatase 77 40 - 150 U/L    ALT 20 0 - 50 U/L    AST 14 0 - 35 U/L   Lipase   Result Value Ref Range    Lipase 118 73 - 393 U/L   UA with Microscopic   Result Value Ref Range    Color Urine Yellow     Appearance Urine Clear     Glucose Urine Negative NEG mg/dL    Bilirubin Urine Negative NEG    Ketones Urine Negative NEG mg/dL    Specific Gravity Urine 1.038 (H) 1.003 - 1.035    Blood Urine Negative NEG    pH Urine 6.5 5.0 - 7.0 pH    Protein Albumin Urine Negative NEG mg/dL    Urobilinogen mg/dL Normal 0.0 - 2.0 mg/dL    Nitrite Urine Negative NEG    Leukocyte Esterase Urine Negative NEG    Source Clean catch urine     WBC Urine 0 0 - 2 /HPF    RBC Urine 0 0 - 2 /HPF    Squamous Epithelial /HPF Urine <1 0 - 1 /HPF    Transitional Epi <1 0 - 1 /HPF    Mucous Urine Present (A) NEG /LPF   HCG qualitative   Result Value Ref Range    HCG Qualitative Serum Negative NEG   Magnesium   Result Value Ref Range    Magnesium 2.0 1.6 - 2.3 mg/dL    Procalcitonin   Result Value Ref Range    Procalcitonin  ng/ml     <0.05  <0.05 ng/ml  Normal  Recommendation: Very low risk of bacterial infection.   Discourage antibiotics unless strong clinical suspicion for serious infection.     Phosphorus   Result Value Ref Range    Phosphorus 2.9 2.5 - 4.5 mg/dL   CRP inflammation   Result Value Ref Range    CRP Inflammation 9.1 (H) 0.0 - 8.0 mg/L   Lactic acid level STAT   Result Value Ref Range    Lactic Acid 1.1 0.7 - 2.1 mmol/L   Comprehensive metabolic panel   Result Value Ref Range    Sodium 136 133 - 144 mmol/L    Potassium 3.8 3.4 - 5.3 mmol/L    Chloride 103 96 - 110 mmol/L    Carbon Dioxide 24 20 - 32 mmol/L    Anion Gap 9 3 - 14 mmol/L    Glucose 122 (H) 70 - 99 mg/dL    Urea Nitrogen 6 (L) 7 - 30 mg/dL    Creatinine 0.56 0.50 - 1.00 mg/dL    GFR Estimate >90  Non  GFR Calc   >60 mL/min/1.7m2    GFR Estimate If Black >90   GFR Calc   >60 mL/min/1.7m2    Calcium 8.6 8.5 - 10.1 mg/dL    Bilirubin Total 1.0 0.2 - 1.3 mg/dL    Albumin 3.6 3.4 - 5.0 g/dL    Protein Total 7.1 6.8 - 8.8 g/dL    Alkaline Phosphatase 61 40 - 150 U/L    ALT 15 0 - 50 U/L    AST 7 0 - 35 U/L   CBC with platelets   Result Value Ref Range    WBC 16.6 (H) 4.0 - 11.0 10e9/L    RBC Count 4.25 3.8 - 5.2 10e12/L    Hemoglobin 12.7 11.7 - 15.7 g/dL    Hematocrit 37.7 35.0 - 47.0 %    MCV 89 78 - 100 fl    MCH 29.9 26.5 - 33.0 pg    MCHC 33.7 31.5 - 36.5 g/dL    RDW 13.4 10.0 - 15.0 %    Platelet Count 178 150 - 450 10e9/L   CRP inflammation   Result Value Ref Range    CRP Inflammation 68.0 (H) 0.0 - 8.0 mg/L   Rapid strep screen   Result Value Ref Range    Specimen Description Throat     Rapid Strep A Screen       NEGATIVE: No Group A streptococcal antigen detected by immunoassay, await   culture report.      Micro Report Status FINAL 02/20/2017    Throat Culture Aerobic Bacterial   Result Value Ref Range    Specimen Description Throat     Culture Micro Moderate  growth Normal venessa to date     Micro Report Status Pending        Signed:  Ana JACOBSSolomon Teran  February 21, 2017 at 1:20 PM    This patient was discussed with the Care Team in the OBS Unit.  The patient's chart was reviewed and the patient was also seen and evaluated by me.  The plan of care was discussed and reviewed with the Care Team.  The above documentation reflects the evaluation, medical decision making and plan under my supervision.    Kamron Romero MD, FACEP  [facility] Staff Emergency Physician

## 2017-02-22 ENCOUNTER — CARE COORDINATION (OUTPATIENT)
Dept: SURGERY | Facility: CLINIC | Age: 22
End: 2017-02-22

## 2017-02-22 LAB
BACTERIA SPEC CULT: NORMAL
MICRO REPORT STATUS: NORMAL
SPECIMEN SOURCE: NORMAL

## 2017-02-22 NOTE — PROGRESS NOTES
"Discharge to home at 2200 with friend as .     /78 (BP Location: Left arm)  Pulse 90  Temp 98.1  F (36.7  C) (Oral)  Resp 18  Ht 1.702 m (5' 7\")  Wt 88.9 kg (196 lb)  LMP 02/05/2017 (Exact Date)  SpO2 97%  Breastfeeding? No  BMI 30.7 kg/m2  Abd lap sites remain CDI. Pt tolerated sandwich, no n/v. Pt urinating without difficulty. Pt's pain controlled with 1 percocet q 4 hours PRN. Discharge medications sent to [facility] in [place]. Patient to  meds. D/c paperwork reviewed with patient. No further questions or concerns. All belongings sent home with patient.   "

## 2017-02-22 NOTE — PROGRESS NOTES
VSS. Abd lap sites remain CDI. Pt tolerating water, Pt going to order food. Pt urinating without difficulty. Dose of IV Dilaudid given to pt when she arrived from PACU for abd pain, pt transitioned to oral pain meds and given 1 percocet, see MAR.  Plan for possible d/c this evening.

## 2017-02-22 NOTE — PROGRESS NOTES
Karthikeyan Abreu is a patient of Dr. Talon Hi that underwent a laparoscopic appendectomy approximately 2 days ago. She is in contact with the clinic at this time for a status update. The patient reports that she is up ad frandy, tolerating  po, afebrile and pain-free. Derma bond is applied and her wounds are healing without signs or symptoms or infection.  Karthikeyan Abreu has not yet had a bowel movement since surgery, She is currently taking senna and denies any bloating or abdominal pain. She will call our office if she continue to have problems with her bowels. She is urinating normally and is slowly returning to her normal routine.    All of her questions were answered including reviewing restrictions and wound care.  She will call this office if she has any further questions and/or concerns.      Pathology reviewed with patient:  No: Not yet available  and N/A     In lieu of a post-op clinic patient that patient would like to be contacted in approximately 10 days via telephone.    A copy of this note was routed to the primary surgeon.

## 2017-02-22 NOTE — PROGRESS NOTES
"Surgery Brief Post-Op Check  Karthikeyan Abreu MRN: [id number]    S: Patient reports mild incisional pain relieved by oral pain medications. Denies nausea, vomiting, chest pain, shortness of breath. Pain well controlled.      O:   /78 (BP Location: Left arm)  Pulse 90  Temp 98.1  F (36.7  C) (Oral)  Resp 18  Ht 1.702 m (5' 7\")  Wt 88.9 kg (196 lb)  LMP 02/05/2017 (Exact Date)  SpO2 97%  Breastfeeding? No  BMI 30.7 kg/m2     UOP: 200cc since surgery    Exam:  General: alert and oriented, NAD  Pulm: lung sounds CTAB, breathing comfortably on room air  CV: RRR, peripheral pulses 2+   Abd: soft, appropriately tender to palpation, non-distended  Extremities: warm, well-perfused  Incisions: c/d/i, incisions with dermabond, no erythema    A/P: 21 year old female POD#0 s/p laparoscopic appendectomy. No acute issues at this time.     - Tolerating diet   - Voiding independently  - Pain controlled with PO meds  - OK to discharge    Shelley Webber, MS4    Jackie Simmons MD  General Surgery PGY-1        "

## 2017-02-22 NOTE — PROVIDER NOTIFICATION
Surgery on-call paged - re: pt would like to discharge. Voiding, tolerating PO, pain control with current pain management. MD to come see patient at bedside.

## 2017-02-23 LAB — COPATH REPORT: NORMAL

## 2017-03-07 ENCOUNTER — TELEPHONE (OUTPATIENT)
Dept: SURGERY | Facility: CLINIC | Age: 22
End: 2017-03-07

## 2017-03-07 NOTE — TELEPHONE ENCOUNTER
Established Patient Telephone Reminder Call    Date of call:  03/07/17  Phone numbers:  Home number on file [phone] (home)    Reached patient/confirmed appointment:  No - left message:   on voicemail  Appointment with:   Dr. Talon Hi  Reason for visit:  Post op

## 2017-03-08 ENCOUNTER — OFFICE VISIT (OUTPATIENT)
Dept: SURGERY | Facility: CLINIC | Age: 22
End: 2017-03-08

## 2017-03-08 VITALS
DIASTOLIC BLOOD PRESSURE: 77 MMHG | HEART RATE: 106 BPM | TEMPERATURE: 98.7 F | SYSTOLIC BLOOD PRESSURE: 124 MMHG | BODY MASS INDEX: 28.95 KG/M2 | HEIGHT: 68 IN | WEIGHT: 191 LBS | OXYGEN SATURATION: 98 %

## 2017-03-08 DIAGNOSIS — K35.30 ACUTE APPENDICITIS WITH LOCALIZED PERITONITIS: Primary | ICD-10-CM

## 2017-03-08 ASSESSMENT — PAIN SCALES - GENERAL: PAINLEVEL: NO PAIN (0)

## 2017-03-08 NOTE — MR AVS SNAPSHOT
"              After Visit Summary   3/8/2017    Karthikeyan Abreu    MRN: [id number]           Patient Information     Date Of Birth          1995        Visit Information        Provider Department      3/8/2017 10:30 AM Talon Hi MD [facility] Surgery        Today's Diagnoses     Acute appendicitis with localized peritonitis    -  1       Follow-ups after your visit        Who to contact     Please call your clinic at [phone] to:    Ask questions about your health    Make or cancel appointments    Discuss your medicines    Learn about your test results    Speak to your doctor   If you have compliments or concerns about an experience at your clinic, or if you wish to file a complaint, please contact [facility] Patient Relations at [phone] or email us at [email or web address]         Additional Information About Your Visit        MyChart Information     [employer] is an electronic gateway that provides easy, online access to your medical records. With [employer], you can request a clinic appointment, read your test results, renew a prescription or communicate with your care team.     To sign up for [employer] visit the website at [email or web address]   You will be asked to enter the access code listed below, as well as some personal information. Please follow the directions to create your username and password.     Your access code is: [email or web address]  Expires: 2017  9:12 PM     Your access code will  in 90 days. If you need help or a new code, please contact your [facility] or call [phone] for assistance.        Care EveryWhere ID     This is your Care EveryWhere ID. This could be used by other organizations to access your [facility] medical records  [id number]        Your Vitals Were     Pulse Temperature Height Last Period Pulse Oximetry BMI (Body Mass Index)    106 98.7  F (37.1  C) 1.727 m (5' 8\") " 02/05/2017 (Exact Date) 98% 29.04 kg/m2       Blood Pressure from Last 3 Encounters:   03/08/17 124/77   02/21/17 126/78    Weight from Last 3 Encounters:   03/08/17 86.6 kg (191 lb) (96 %)*   02/20/17 88.9 kg (196 lb) (97 %)*     * Growth percentiles are based on [facility] [place] 2-20 Years data.              Today, you had the following     No orders found for display       Primary Care Provider    Physician No Ref-Primary       No address on file        Thank you!     Thank you for choosing [facility]  for your care. Our goal is always to provide you with excellent care. Hearing back from our patients is one way we can continue to improve our services. Please take a few minutes to complete the written survey that you may receive in the mail after your visit with us. Thank you!             Your Updated Medication List - Protect others around you: Learn how to safely use, store and throw away your medicines at www.disposemymeds.org.          This list is accurate as of: 3/8/17 10:52 AM.  Always use your most recent med list.                   Brand Name Dispense Instructions for use    oxyCODONE-acetaminophen 5-325 MG per tablet    PERCOCET    15 tablet    Take 1-2 tablets by mouth every 4 hours as needed for pain (moderate to severe)       senna-docusate 8.6-50 MG per tablet    SENOKOT-S;PERICOLACE    100 tablet    Take 1-2 tablets by mouth 2 times daily

## 2017-03-08 NOTE — NURSING NOTE
"Chief Complaint   Patient presents with     Surgical Followup     post op       Vitals:    03/08/17 1037   BP: 124/77   BP Location: Left arm   Patient Position: Chair   Cuff Size: Adult Regular   Pulse: 106   Temp: 98.7  F (37.1  C)   SpO2: 98%   Weight: 191 lb   Height: 5' 8\"       Body mass index is 29.04 kg/(m^2).      Rd Castorena MA                          "

## 2017-03-08 NOTE — Clinical Note
"3/8/2017       RE: Karthikeyan Abreu  [address] [place] [address]     Dear Colleague,    Thank you for referring your patient, Karthikeyan Abreu, to the [facility] GENERAL SURGERY at [facility]. Please see a copy of my visit note below.    Surgery Clinic Post Op Visit    Procedure: lap appy  Date:2/21/17    Fevers: none  Appetite:good  Nausea/vomiting: normal  Bowel movements:normal  Use of pain medications: none  Ambulating:without difficulty    Issues/Complaints: No issues, no abd pain, feeling well    PE:  /77 (BP Location: Left arm, Patient Position: Chair, Cuff Size: Adult Regular)  Pulse 106  Temp 98.7  F (37.1  C)  Ht 1.727 m (5' 8\")  Wt 86.6 kg (191 lb)  LMP 02/05/2017 (Exact Date)  SpO2 98%  BMI 29.04 kg/m2  Gen: alert, no distress  Incision: all port sites intact, no erythema,no drainage, healing well  Abd: soft, nontender, nondistended    Path: acute appendicitis    Plan:  S/p lap appy doing well  Okay to gradually reintroduce exercise and lifting  RTC as needed    Talon Hi MD  [phone]      Again, thank you for allowing me to participate in the care of your patient.      Sincerely,    Talon Hi MD    "

## 2017-03-08 NOTE — LETTER
Date:March 9, 2017      Patient was self referred, no letter generated. Do not send.        [facility] Health Information

## 2017-03-08 NOTE — PROGRESS NOTES
"Surgery Clinic Post Op Visit    Procedure: lap appy  Date:2/21/17    Fevers: none  Appetite:good  Nausea/vomiting: normal  Bowel movements:normal  Use of pain medications: none  Ambulating:without difficulty    Issues/Complaints: No issues, no abd pain, feeling well    PE:  /77 (BP Location: Left arm, Patient Position: Chair, Cuff Size: Adult Regular)  Pulse 106  Temp 98.7  F (37.1  C)  Ht 1.727 m (5' 8\")  Wt 86.6 kg (191 lb)  LMP 02/05/2017 (Exact Date)  SpO2 98%  BMI 29.04 kg/m2  Gen: alert, no distress  Incision: all port sites intact, no erythema,no drainage, healing well  Abd: soft, nontender, nondistended    Path: acute appendicitis    Plan:  S/p lap appy doing well  Okay to gradually reintroduce exercise and lifting  RTC as needed    Talon Hi MD  [phone]    "

## 2018-09-18 ENCOUNTER — NURSE TRIAGE (OUTPATIENT)
Dept: NURSING | Facility: CLINIC | Age: 23
End: 2018-09-18

## 2018-09-19 NOTE — TELEPHONE ENCOUNTER
"Patient states she had \"mostly\" protected intercourse this afternoon, is concerned about possible pregnancy.   States that for part of the encounter today there was no protection used.   Has questions about using plan B.  Denies concerns about sexually transmitted diseases.  Is due for her regular menstrual cycle the end of this month.     Provided Patient information regarding Plan B from [employer].   Patient is wondering if she would require a larger dose based on her weight.     Protocol and care advice reviewed.   Caller states understanding of the recommended disposition. Patient would like to see a provider tomorrow to discuss Plan B options and medication dosing. She is going to call [place] tomorrow morning to schedule an appointment.   Advised to call back if further questions or concerns.       Reason for Disposition    Caller has medication question about med not prescribed by PCP and triager unable to answer question (e.g., compatibility with other med, storage)    Protocols used: MEDICATION QUESTION CALL-ADULT-      "
